# Patient Record
Sex: FEMALE | Race: WHITE | NOT HISPANIC OR LATINO
[De-identification: names, ages, dates, MRNs, and addresses within clinical notes are randomized per-mention and may not be internally consistent; named-entity substitution may affect disease eponyms.]

---

## 2018-02-20 ENCOUNTER — APPOINTMENT (OUTPATIENT)
Dept: INTERNAL MEDICINE | Facility: CLINIC | Age: 58
End: 2018-02-20
Payer: COMMERCIAL

## 2018-02-20 ENCOUNTER — LABORATORY RESULT (OUTPATIENT)
Age: 58
End: 2018-02-20

## 2018-02-20 ENCOUNTER — NON-APPOINTMENT (OUTPATIENT)
Age: 58
End: 2018-02-20

## 2018-02-20 VITALS
WEIGHT: 110 LBS | HEART RATE: 70 BPM | SYSTOLIC BLOOD PRESSURE: 130 MMHG | BODY MASS INDEX: 19.49 KG/M2 | RESPIRATION RATE: 14 BRPM | DIASTOLIC BLOOD PRESSURE: 78 MMHG | HEIGHT: 63 IN

## 2018-02-20 VITALS
HEART RATE: 74 BPM | WEIGHT: 108 LBS | DIASTOLIC BLOOD PRESSURE: 80 MMHG | BODY MASS INDEX: 19.14 KG/M2 | SYSTOLIC BLOOD PRESSURE: 125 MMHG | RESPIRATION RATE: 15 BRPM | HEIGHT: 63 IN

## 2018-02-20 DIAGNOSIS — M25.511 PAIN IN RIGHT SHOULDER: ICD-10-CM

## 2018-02-20 PROCEDURE — 99396 PREV VISIT EST AGE 40-64: CPT | Mod: 25

## 2018-02-20 PROCEDURE — 99213 OFFICE O/P EST LOW 20 MIN: CPT | Mod: 25

## 2018-02-20 PROCEDURE — G0008: CPT

## 2018-02-20 PROCEDURE — 93000 ELECTROCARDIOGRAM COMPLETE: CPT

## 2018-02-20 PROCEDURE — 90686 IIV4 VACC NO PRSV 0.5 ML IM: CPT

## 2018-03-05 LAB
25(OH)D3 SERPL-MCNC: 36.9 NG/ML
ALBUMIN SERPL ELPH-MCNC: 5.1 G/DL
ALP BLD-CCNC: 60 U/L
ALT SERPL-CCNC: 21 U/L
ANION GAP SERPL CALC-SCNC: 15 MMOL/L
APPEARANCE: CLEAR
AST SERPL-CCNC: 38 U/L
BASOPHILS # BLD AUTO: 0.04 K/UL
BASOPHILS NFR BLD AUTO: 0.6 %
BILIRUB SERPL-MCNC: 0.5 MG/DL
BILIRUBIN URINE: NEGATIVE
BLOOD URINE: NEGATIVE
BUN SERPL-MCNC: 14 MG/DL
CALCIUM SERPL-MCNC: 10 MG/DL
CHLORIDE SERPL-SCNC: 100 MMOL/L
CHOLEST SERPL-MCNC: 237 MG/DL
CHOLEST/HDLC SERPL: 2.3 RATIO
CO2 SERPL-SCNC: 27 MMOL/L
COLOR: YELLOW
CREAT SERPL-MCNC: 1.1 MG/DL
CRP SERPL HS-MCNC: 1 MG/L
EOSINOPHIL # BLD AUTO: 0.15 K/UL
EOSINOPHIL NFR BLD AUTO: 2.1 %
GLUCOSE BS SERPL-MCNC: 88 MG/DL
GLUCOSE QUALITATIVE U: NEGATIVE MG/DL
GLUCOSE SERPL-MCNC: 92 MG/DL
HBA1C MFR BLD HPLC: 4.9 %
HCT VFR BLD CALC: 43.5 %
HDLC SERPL-MCNC: 101 MG/DL
HGB BLD-MCNC: 14.6 G/DL
IMM GRANULOCYTES NFR BLD AUTO: 0.1 %
KETONES URINE: NEGATIVE
LDLC SERPL CALC-MCNC: 113 MG/DL
LEUKOCYTE ESTERASE URINE: ABNORMAL
LYMPHOCYTES # BLD AUTO: 2.23 K/UL
LYMPHOCYTES NFR BLD AUTO: 30.8 %
MAN DIFF?: NORMAL
MCHC RBC-ENTMCNC: 33 PG
MCHC RBC-ENTMCNC: 33.6 GM/DL
MCV RBC AUTO: 98.4 FL
MONOCYTES # BLD AUTO: 0.7 K/UL
MONOCYTES NFR BLD AUTO: 9.7 %
NEUTROPHILS # BLD AUTO: 4.1 K/UL
NEUTROPHILS NFR BLD AUTO: 56.7 %
NITRITE URINE: NEGATIVE
PH URINE: 7
PLATELET # BLD AUTO: 293 K/UL
POTASSIUM SERPL-SCNC: 4.1 MMOL/L
PROT SERPL-MCNC: 8.4 G/DL
PROTEIN URINE: NEGATIVE MG/DL
RBC # BLD: 4.42 M/UL
RBC # FLD: 13 %
SODIUM SERPL-SCNC: 142 MMOL/L
SPECIFIC GRAVITY URINE: 1.01
T4 FREE SERPL-MCNC: 1.1 NG/DL
TRIGL SERPL-MCNC: 117 MG/DL
TSH SERPL-ACNC: 2.37 UIU/ML
UROBILINOGEN URINE: NEGATIVE MG/DL
VIT B12 SERPL-MCNC: 540 PG/ML
WBC # FLD AUTO: 7.23 K/UL

## 2018-03-06 ENCOUNTER — APPOINTMENT (OUTPATIENT)
Dept: MAMMOGRAPHY | Facility: HOSPITAL | Age: 58
End: 2018-03-06

## 2018-03-13 ENCOUNTER — LABORATORY RESULT (OUTPATIENT)
Age: 58
End: 2018-03-13

## 2018-03-13 ENCOUNTER — APPOINTMENT (OUTPATIENT)
Dept: MAMMOGRAPHY | Facility: HOSPITAL | Age: 58
End: 2018-03-13
Payer: COMMERCIAL

## 2018-03-13 ENCOUNTER — OUTPATIENT (OUTPATIENT)
Dept: OUTPATIENT SERVICES | Facility: HOSPITAL | Age: 58
LOS: 1 days | End: 2018-03-13
Payer: COMMERCIAL

## 2018-03-13 ENCOUNTER — APPOINTMENT (OUTPATIENT)
Dept: RADIOLOGY | Facility: HOSPITAL | Age: 58
End: 2018-03-13
Payer: COMMERCIAL

## 2018-03-13 DIAGNOSIS — M85.852 OTHER SPECIFIED DISORDERS OF BONE DENSITY AND STRUCTURE, LEFT THIGH: ICD-10-CM

## 2018-03-13 DIAGNOSIS — Z00.8 ENCOUNTER FOR OTHER GENERAL EXAMINATION: ICD-10-CM

## 2018-03-13 PROCEDURE — 77080 DXA BONE DENSITY AXIAL: CPT | Mod: 26

## 2018-03-13 PROCEDURE — 77067 SCR MAMMO BI INCL CAD: CPT | Mod: 26

## 2018-03-13 PROCEDURE — 77063 BREAST TOMOSYNTHESIS BI: CPT

## 2018-03-13 PROCEDURE — 77063 BREAST TOMOSYNTHESIS BI: CPT | Mod: 26

## 2018-03-13 PROCEDURE — 77067 SCR MAMMO BI INCL CAD: CPT

## 2018-03-13 PROCEDURE — 77080 DXA BONE DENSITY AXIAL: CPT

## 2018-03-20 DIAGNOSIS — Z12.31 ENCOUNTER FOR SCREENING MAMMOGRAM FOR MALIGNANT NEOPLASM OF BREAST: ICD-10-CM

## 2018-03-20 DIAGNOSIS — M85.852 OTHER SPECIFIED DISORDERS OF BONE DENSITY AND STRUCTURE, LEFT THIGH: ICD-10-CM

## 2018-06-19 ENCOUNTER — APPOINTMENT (OUTPATIENT)
Dept: RADIOLOGY | Facility: HOSPITAL | Age: 58
End: 2018-06-19
Payer: COMMERCIAL

## 2018-06-19 ENCOUNTER — OUTPATIENT (OUTPATIENT)
Dept: OUTPATIENT SERVICES | Facility: HOSPITAL | Age: 58
LOS: 1 days | End: 2018-06-19
Payer: COMMERCIAL

## 2018-06-19 DIAGNOSIS — Z00.8 ENCOUNTER FOR OTHER GENERAL EXAMINATION: ICD-10-CM

## 2018-06-19 PROCEDURE — 71046 X-RAY EXAM CHEST 2 VIEWS: CPT | Mod: 26

## 2018-06-19 PROCEDURE — 71046 X-RAY EXAM CHEST 2 VIEWS: CPT

## 2018-08-09 ENCOUNTER — RESULT REVIEW (OUTPATIENT)
Age: 58
End: 2018-08-09

## 2019-03-18 ENCOUNTER — FORM ENCOUNTER (OUTPATIENT)
Age: 59
End: 2019-03-18

## 2019-03-18 ENCOUNTER — RX RENEWAL (OUTPATIENT)
Age: 59
End: 2019-03-18

## 2019-03-19 ENCOUNTER — APPOINTMENT (OUTPATIENT)
Dept: MAMMOGRAPHY | Facility: HOSPITAL | Age: 59
End: 2019-03-19
Payer: COMMERCIAL

## 2019-03-19 ENCOUNTER — OUTPATIENT (OUTPATIENT)
Dept: OUTPATIENT SERVICES | Facility: HOSPITAL | Age: 59
LOS: 1 days | End: 2019-03-19
Payer: COMMERCIAL

## 2019-03-19 DIAGNOSIS — Z00.8 ENCOUNTER FOR OTHER GENERAL EXAMINATION: ICD-10-CM

## 2019-03-19 PROCEDURE — 77067 SCR MAMMO BI INCL CAD: CPT

## 2019-03-19 PROCEDURE — 77063 BREAST TOMOSYNTHESIS BI: CPT

## 2019-03-19 PROCEDURE — 77067 SCR MAMMO BI INCL CAD: CPT | Mod: 26

## 2019-03-19 PROCEDURE — 77063 BREAST TOMOSYNTHESIS BI: CPT | Mod: 26

## 2019-04-18 ENCOUNTER — APPOINTMENT (OUTPATIENT)
Dept: INTERNAL MEDICINE | Facility: CLINIC | Age: 59
End: 2019-04-18
Payer: COMMERCIAL

## 2019-04-18 VITALS
WEIGHT: 108 LBS | BODY MASS INDEX: 19.14 KG/M2 | HEIGHT: 63 IN | HEART RATE: 75 BPM | SYSTOLIC BLOOD PRESSURE: 124 MMHG | DIASTOLIC BLOOD PRESSURE: 72 MMHG | RESPIRATION RATE: 15 BRPM

## 2019-04-18 DIAGNOSIS — M75.30 CALCIFIC TENDINITIS OF UNSPECIFIED SHOULDER: ICD-10-CM

## 2019-04-18 DIAGNOSIS — I35.1 NONRHEUMATIC AORTIC (VALVE) INSUFFICIENCY: ICD-10-CM

## 2019-04-18 DIAGNOSIS — R00.1 BRADYCARDIA, UNSPECIFIED: ICD-10-CM

## 2019-04-18 PROCEDURE — 99396 PREV VISIT EST AGE 40-64: CPT

## 2019-04-18 NOTE — PHYSICAL EXAM
[No Acute Distress] : no acute distress [Well Nourished] : well nourished [Well Developed] : well developed [Well-Appearing] : well-appearing [PERRL] : pupils equal round and reactive to light [Normal Sclera/Conjunctiva] : normal sclera/conjunctiva [EOMI] : extraocular movements intact [Normal Outer Ear/Nose] : the outer ears and nose were normal in appearance [Normal Oropharynx] : the oropharynx was normal [No JVD] : no jugular venous distention [Supple] : supple [No Lymphadenopathy] : no lymphadenopathy [Thyroid Normal, No Nodules] : the thyroid was normal and there were no nodules present [No Respiratory Distress] : no respiratory distress  [Clear to Auscultation] : lungs were clear to auscultation bilaterally [No Accessory Muscle Use] : no accessory muscle use [Normal Rate] : normal rate  [Regular Rhythm] : with a regular rhythm [Normal S1, S2] : normal S1 and S2 [No Murmur] : no murmur heard [No Carotid Bruits] : no carotid bruits [No Abdominal Bruit] : a ~M bruit was not heard ~T in the abdomen [No Varicosities] : no varicosities [Pedal Pulses Present] : the pedal pulses are present [No Edema] : there was no peripheral edema [No Extremity Clubbing/Cyanosis] : no extremity clubbing/cyanosis [No Palpable Aorta] : no palpable aorta [Declined Breast Exam] : declined breast exam  [Soft] : abdomen soft [Non Tender] : non-tender [Non-distended] : non-distended [No Masses] : no abdominal mass palpated [No HSM] : no HSM [Normal Bowel Sounds] : normal bowel sounds [No Hernias] : no hernias [Normal Posterior Cervical Nodes] : no posterior cervical lymphadenopathy [Declined Rectal Exam] : declined rectal exam [Normal Anterior Cervical Nodes] : no anterior cervical lymphadenopathy [No CVA Tenderness] : no CVA  tenderness [No Spinal Tenderness] : no spinal tenderness [No Joint Swelling] : no joint swelling [Grossly Normal Strength/Tone] : grossly normal strength/tone [No Rash] : no rash [Coordination Grossly Intact] : coordination grossly intact [Normal Gait] : normal gait [No Focal Deficits] : no focal deficits [Deep Tendon Reflexes (DTR)] : deep tendon reflexes were 2+ and symmetric [Speech Grossly Normal] : speech grossly normal [Memory Grossly Normal] : memory grossly normal [Normal Affect] : the affect was normal [Alert and Oriented x3] : oriented to person, place, and time [Normal Mood] : the mood was normal [Normal Insight/Judgement] : insight and judgment were intact [Scoliosis] : no scoliosis [Kyphosis] : no kyphosis

## 2019-04-18 NOTE — HISTORY OF PRESENT ILLNESS
[FreeTextEntry1] : Comes to the office for a comprehensive physical examination to review her medications and discuss her overall health recent issues with hemorrhoids and calcific bursitis of her shoulder [de-identified] : 58-year-old woman comes to the office for a comprehensive physical examination and history of hemorrhoids migraines calcific bursitis of her right shoulder mild aortic insufficiency and sinus bradycardia patient's shoulder has been doing quite well with minimal to no residual status post removal of calcium from the shoulder. She denies temperature chills sweats or myalgias she gets occasional headaches and denies sinus congestion sore throat cough wheezing pleurisy chest pain shortness of breath exertional dyspnea lightheadedness palpitations dizziness vertigo or syncope she denies abdominal pain she has occasional hemorrhoidal bleeding colonoscopy was performed in 2017% no constipation diarrhea black stools her appetite has been good and her weight is very consistent between 107 and 109 lbs. she sleeps well and has no nocturia she denies dysuria and gross hematuria

## 2019-04-18 NOTE — ASSESSMENT
[FreeTextEntry1] : Physical exam shows a well-developed female in no acute distress her blood pressure was 124/72 weight 108 height 5 foot 3 BMI 19.13 heart rate is 75 respirations at 18 HEENT was unremarkable chest was clear cardiovascular exam shows a normal S1 and S2 no murmurs were appreciated extremities show no clubbing cyanosis or edema abdomen was soft and neurologic exam was nonfocal EKG recently done at her cardiologist 2 weeks ago will be obtained last slip was given for complete blood test including a Lyme's test as per her cardiologist which is secondary to her bradycardia also included are CBC full chemistries lipid profile hemoglobin A1c thyroid profile C-reactive protein urinalysis and vitamins and D3 and B12. Underwent a mammogram in March of 2019 and a bone density in March of 2018 and her colonoscopy was performed in September 2070

## 2019-04-18 NOTE — REVIEW OF SYSTEMS
[Joint Pain] : joint pain [Negative] : Heme/Lymph [Fever] : no fever [Chills] : no chills [Fatigue] : no fatigue [Hot Flashes] : no hot flashes [Night Sweats] : no night sweats [Recent Change In Weight] : ~T no recent weight change [Discharge] : no discharge [Pain] : no pain [Redness] : no redness [Vision Problems] : no vision problems [Dryness] : no dryness  [Earache] : no earache [Itching] : no itching [Hearing Loss] : no hearing loss [Nosebleed] : no nosebleeds [Nasal Discharge] : no nasal discharge [Hoarseness] : no hoarseness [Palpitations] : no palpitations [Chest Pain] : no chest pain [Sore Throat] : no sore throat [Leg Claudication] : no leg claudication [Lower Ext Edema] : no lower extremity edema [Orthopnea] : no orthopnea [Paroysmal Nocturnal Dyspnea] : no paroysmal nocturnal dyspnea [Cough] : no cough [Shortness Of Breath] : no shortness of breath [Wheezing] : no wheezing [Dyspnea on Exertion] : no dyspnea on exertion [Abdominal Pain] : no abdominal pain [Nausea] : no nausea [Constipation] : no constipation [Vomiting] : no vomiting [Diarrhea] : diarrhea [Heartburn] : no heartburn [Melena] : no melena [Dysuria] : no dysuria [Incontinence] : no incontinence [Hematuria] : no hematuria [Poor Libido] : libido not poor [Frequency] : no frequency [Vaginal Discharge] : no vaginal discharge [Joint Stiffness] : no joint stiffness [Joint Swelling] : no joint swelling [Back Pain] : no back pain [Muscle Weakness] : no muscle weakness [Muscle Pain] : no muscle pain [Itching] : no itching [Nail Changes] : no nail changes [Mole Changes] : no mole changes [Hair Changes] : no hair changes [Skin Rash] : no skin rash [Headache] : no headache [Dizziness] : no dizziness [Fainting] : no fainting [Confusion] : no confusion [Memory Loss] : no memory loss [Anxiety] : no anxiety [Suicidal] : not suicidal [Insomnia] : no insomnia [Easy Bleeding] : no easy bleeding [Depression] : no depression [Easy Bruising] : no easy bruising [Swollen Glands] : no swollen glands [FreeTextEntry9] : right shoulder

## 2019-04-18 NOTE — HEALTH RISK ASSESSMENT
[No falls in past year] : Patient reported no falls in the past year [0] : 2) Feeling down, depressed, or hopeless: Not at all (0) [[id number]] : 0 [MammogramDate] : 03/19 [PapSmearDate] : 08/18 [BoneDensityDate] : 03/18 [ColonoscopyDate] : 09/17

## 2019-06-16 ENCOUNTER — TRANSCRIPTION ENCOUNTER (OUTPATIENT)
Age: 59
End: 2019-06-16

## 2019-06-28 ENCOUNTER — LABORATORY RESULT (OUTPATIENT)
Age: 59
End: 2019-06-28

## 2019-07-19 LAB
25(OH)D3 SERPL-MCNC: 48.5 NG/ML
ALBUMIN SERPL ELPH-MCNC: 4.9 G/DL
ALP BLD-CCNC: 71 U/L
ALT SERPL-CCNC: 16 U/L
ANION GAP SERPL CALC-SCNC: 11 MMOL/L
APPEARANCE: CLEAR
AST SERPL-CCNC: 33 U/L
B BURGDOR AB SER-IMP: NEGATIVE
B BURGDOR IGG+IGM SER QL IB: NORMAL
B BURGDOR IGM PATRN SER IB-IMP: NEGATIVE
B BURGDOR18/20KD IGM SER QL IB: NORMAL
B BURGDOR18KD IGG SER QL IB: NORMAL
B BURGDOR23KD IGG SER QL IB: NORMAL
B BURGDOR23KD IGM SER QL IB: NORMAL
B BURGDOR28KD AB SER QL IB: NORMAL
B BURGDOR28KD IGG SER QL IB: NORMAL
B BURGDOR30KD AB SER QL IB: NORMAL
B BURGDOR30KD IGG SER QL IB: NORMAL
B BURGDOR31KD IGG SER QL IB: NORMAL
B BURGDOR31KD IGM SER QL IB: NORMAL
B BURGDOR39KD IGG SER QL IB: NORMAL
B BURGDOR39KD IGM SER QL IB: NORMAL
B BURGDOR41KD IGG SER QL IB: PRESENT
B BURGDOR41KD IGM SER QL IB: PRESENT
B BURGDOR45KD AB SER QL IB: NORMAL
B BURGDOR45KD IGG SER QL IB: NORMAL
B BURGDOR58KD AB SER QL IB: NORMAL
B BURGDOR58KD IGG SER QL IB: NORMAL
B BURGDOR66KD IGG SER QL IB: NORMAL
B BURGDOR66KD IGM SER QL IB: NORMAL
B BURGDOR93KD IGG SER QL IB: NORMAL
B BURGDOR93KD IGM SER QL IB: NORMAL
BASOPHILS # BLD AUTO: 0.06 K/UL
BASOPHILS NFR BLD AUTO: 0.8 %
BILIRUB SERPL-MCNC: 0.5 MG/DL
BILIRUBIN URINE: NEGATIVE
BLOOD URINE: NEGATIVE
BUN SERPL-MCNC: 14 MG/DL
CALCIUM SERPL-MCNC: 9.9 MG/DL
CHLORIDE SERPL-SCNC: 104 MMOL/L
CHOLEST SERPL-MCNC: 213 MG/DL
CHOLEST/HDLC SERPL: 2.6 RATIO
CO2 SERPL-SCNC: 27 MMOL/L
COLOR: NORMAL
CREAT SERPL-MCNC: 0.98 MG/DL
CRP SERPL HS-MCNC: 0.78 MG/L
EOSINOPHIL # BLD AUTO: 0.16 K/UL
EOSINOPHIL NFR BLD AUTO: 2.2 %
ESTIMATED AVERAGE GLUCOSE: 97 MG/DL
GLUCOSE BS SERPL-MCNC: 103 MG/DL
GLUCOSE QUALITATIVE U: NEGATIVE
GLUCOSE SERPL-MCNC: 102 MG/DL
HBA1C MFR BLD HPLC: 5 %
HCT VFR BLD CALC: 42.2 %
HDLC SERPL-MCNC: 81 MG/DL
HGB BLD-MCNC: 13.9 G/DL
IMM GRANULOCYTES NFR BLD AUTO: 0.6 %
KETONES URINE: NEGATIVE
LDLC SERPL CALC-MCNC: 114 MG/DL
LEUKOCYTE ESTERASE URINE: NEGATIVE
LYMPHOCYTES # BLD AUTO: 1.89 K/UL
LYMPHOCYTES NFR BLD AUTO: 26.1 %
MAN DIFF?: NORMAL
MCHC RBC-ENTMCNC: 32.5 PG
MCHC RBC-ENTMCNC: 32.9 GM/DL
MCV RBC AUTO: 98.6 FL
MONOCYTES # BLD AUTO: 0.59 K/UL
MONOCYTES NFR BLD AUTO: 8.1 %
NEUTROPHILS # BLD AUTO: 4.5 K/UL
NEUTROPHILS NFR BLD AUTO: 62.2 %
NITRITE URINE: NEGATIVE
PH URINE: 6
PLATELET # BLD AUTO: 305 K/UL
POTASSIUM SERPL-SCNC: 4 MMOL/L
PROT SERPL-MCNC: 7.6 G/DL
PROTEIN URINE: NEGATIVE
RBC # BLD: 4.28 M/UL
RBC # FLD: 12.7 %
SODIUM SERPL-SCNC: 142 MMOL/L
SPECIFIC GRAVITY URINE: 1.01
T4 FREE SERPL-MCNC: 1.1 NG/DL
TRIGL SERPL-MCNC: 91 MG/DL
TSH SERPL-ACNC: 2.4 UIU/ML
UROBILINOGEN URINE: NORMAL
VIT B12 SERPL-MCNC: 604 PG/ML
WBC # FLD AUTO: 7.24 K/UL

## 2019-09-16 ENCOUNTER — RESULT REVIEW (OUTPATIENT)
Age: 59
End: 2019-09-16

## 2019-10-16 ENCOUNTER — APPOINTMENT (OUTPATIENT)
Dept: INTERNAL MEDICINE | Facility: CLINIC | Age: 59
End: 2019-10-16
Payer: COMMERCIAL

## 2019-10-16 VITALS
HEART RATE: 76 BPM | DIASTOLIC BLOOD PRESSURE: 78 MMHG | SYSTOLIC BLOOD PRESSURE: 122 MMHG | BODY MASS INDEX: 18.96 KG/M2 | WEIGHT: 107 LBS | RESPIRATION RATE: 15 BRPM | HEIGHT: 63 IN

## 2019-10-16 VITALS — TEMPERATURE: 98.3 F
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
## 2019-10-16 PROCEDURE — 99215 OFFICE O/P EST HI 40 MIN: CPT

## 2019-10-16 NOTE — HEALTH RISK ASSESSMENT
[Yes] : Yes [No falls in past year] : Patient reported no falls in the past year [0] : 2) Feeling down, depressed, or hopeless: Not at all (0) [] : No [[id number]] : 0

## 2019-10-16 NOTE — ASSESSMENT
[FreeTextEntry1] : Physical exam shows a well-developed female in no acute distress her temperature was 98.3°F blood pressure 122/78 heart rate of 76 respiratory rate of 15 HEENT was unremarkable chest was clear cardiovascular exam was regular abdomen was soft there was a 2 x 1 cm red area in the right lower back associated erythema and induration there was no clear center neurologic exam was nonfocal in view of her locations in the [place] for my review is an area that Lyme's disease at present we'll treat her with doxycycline 100 mg twice a day for 3 weeks patient had a baseline Lyme's Western blot and antibody done in June of 2019. Insulin 3 months we'll repeat the Lyme's titer to evaluate whether her treatment was for Lyme's or for nothing she is up-to-date with her gynecologist ophthalmologists dermatologist and had a recent colonoscopy on September 15, 2017. She defers on the influenza shot today

## 2019-10-16 NOTE — PHYSICAL EXAM
[No Acute Distress] : no acute distress [Well Nourished] : well nourished [Well Developed] : well developed [Well-Appearing] : well-appearing [Normal Voice/Communication] : normal voice/communication [Normal Sclera/Conjunctiva] : normal sclera/conjunctiva [PERRL] : pupils equal round and reactive to light [EOMI] : extraocular movements intact [Normal Outer Ear/Nose] : the outer ears and nose were normal in appearance [Normal Oropharynx] : the oropharynx was normal [Normal TMs] : both tympanic membranes were normal [Normal Nasal Mucosa] : the nasal mucosa was normal [No JVD] : no jugular venous distention [No Lymphadenopathy] : no lymphadenopathy [Supple] : supple [Thyroid Normal, No Nodules] : the thyroid was normal and there were no nodules present [No Respiratory Distress] : no respiratory distress  [No Accessory Muscle Use] : no accessory muscle use [Clear to Auscultation] : lungs were clear to auscultation bilaterally [Normal Percussion] : the chest was normal to percussion [Normal Rate] : normal rate  [Regular Rhythm] : with a regular rhythm [Normal S1, S2] : normal S1 and S2 [No Murmur] : no murmur heard [No Carotid Bruits] : no carotid bruits [No Abdominal Bruit] : a ~M bruit was not heard ~T in the abdomen [No Varicosities] : no varicosities [Pedal Pulses Present] : the pedal pulses are present [No Edema] : there was no peripheral edema [No Palpable Aorta] : no palpable aorta [No Extremity Clubbing/Cyanosis] : no extremity clubbing/cyanosis [Declined Breast Exam] : declined breast exam  [Soft] : abdomen soft [Non Tender] : non-tender [Non-distended] : non-distended [No Masses] : no abdominal mass palpated [No HSM] : no HSM [Normal Bowel Sounds] : normal bowel sounds [No Hernias] : no hernias [Declined Rectal Exam] : declined rectal exam [Normal Supraclavicular Nodes] : no supraclavicular lymphadenopathy [Normal Axillary Nodes] : no axillary lymphadenopathy [Normal Posterior Cervical Nodes] : no posterior cervical lymphadenopathy [Normal Anterior Cervical Nodes] : no anterior cervical lymphadenopathy [Normal Inguinal Nodes] : no inguinal lymphadenopathy [Normal Femoral Nodes] : no femoral lymphadenopathy [No CVA Tenderness] : no CVA  tenderness [No Spinal Tenderness] : no spinal tenderness [No Joint Swelling] : no joint swelling [Grossly Normal Strength/Tone] : grossly normal strength/tone [No Rash] : no rash [Coordination Grossly Intact] : coordination grossly intact [No Focal Deficits] : no focal deficits [Normal Gait] : normal gait [Deep Tendon Reflexes (DTR)] : deep tendon reflexes were 2+ and symmetric [Speech Grossly Normal] : speech grossly normal [Memory Grossly Normal] : memory grossly normal [Normal Affect] : the affect was normal [Alert and Oriented x3] : oriented to person, place, and time [Normal Mood] : the mood was normal [Normal Insight/Judgement] : insight and judgment were intact [Kyphosis] : no kyphosis [Scoliosis] : no scoliosis [de-identified] : Small 2 x 1 cm area of erythema is somewhat indurated on her right lower back

## 2019-10-16 NOTE — REVIEW OF SYSTEMS
[Joint Pain] : joint pain [Skin Rash] : skin rash [Negative] : Heme/Lymph [Fever] : no fever [Chills] : no chills [Fatigue] : no fatigue [Hot Flashes] : no hot flashes [Night Sweats] : no night sweats [Recent Change In Weight] : ~T no recent weight change [Discharge] : no discharge [Pain] : no pain [Redness] : no redness [Dryness] : no dryness  [Vision Problems] : no vision problems [Itching] : no itching [Earache] : no earache [Hearing Loss] : no hearing loss [Nosebleed] : no nosebleeds [Hoarseness] : no hoarseness [Nasal Discharge] : no nasal discharge [Sore Throat] : no sore throat [Chest Pain] : no chest pain [Palpitations] : no palpitations [Leg Claudication] : no leg claudication [Lower Ext Edema] : no lower extremity edema [Orthopnea] : no orthopnea [Shortness Of Breath] : no shortness of breath [Wheezing] : no wheezing [Cough] : no cough [Dyspnea on Exertion] : no dyspnea on exertion [Abdominal Pain] : no abdominal pain [Nausea] : no nausea [Constipation] : no constipation [Diarrhea] : diarrhea [Vomiting] : no vomiting [Heartburn] : no heartburn [Melena] : no melena [Dysuria] : no dysuria [Incontinence] : no incontinence [Poor Libido] : libido not poor [Hematuria] : no hematuria [Frequency] : no frequency [Vaginal Discharge] : no vaginal discharge [Joint Stiffness] : no joint stiffness [Joint Swelling] : no joint swelling [Muscle Weakness] : no muscle weakness [Muscle Pain] : no muscle pain [Back Pain] : no back pain [Mole Changes] : no mole changes [Nail Changes] : no nail changes [Hair Changes] : no hair changes [Headache] : no headache [Dizziness] : no dizziness [Fainting] : no fainting [Confusion] : no confusion [Memory Loss] : no memory loss [Suicidal] : not suicidal [Insomnia] : no insomnia [Anxiety] : no anxiety [Depression] : no depression [Easy Bleeding] : no easy bleeding [Easy Bruising] : no easy bruising [Swollen Glands] : no swollen glands [FreeTextEntry9] : right shoulder [de-identified] : reddish area right lower back

## 2019-10-16 NOTE — HISTORY OF PRESENT ILLNESS
[FreeTextEntry1] : Comes to the office for followup to review her medications and discuss her overall health recently bitten by a tick [de-identified] :  59-year-old woman with a history of migraines hemorrhoids bursitis over her shoulder comes to the office for followup patient was in the [facility] this weekend returning home on Monday on Tuesday she noticed a tick on her right lower back removed the tick and the area has become red and swollen and there is no bull's-eye rash she denies temperature chills sweats or myalgias she has had no headaches sinus congestion sore throat cough wheezing pleurisy chest pain shortness of breath exertional dyspnea lightheadedness palpitations dizziness vertigo or syncope she denies abdominal pain nausea vomiting diarrhea constipation bright red blood per rectum or black stools she has had no musculoskeletal complaints or muscle aches she has had no urinary symptoms leg edema

## 2021-04-26 ENCOUNTER — RESULT REVIEW (OUTPATIENT)
Age: 61
End: 2021-04-26

## 2021-06-18 ENCOUNTER — OUTPATIENT (OUTPATIENT)
Dept: OUTPATIENT SERVICES | Facility: HOSPITAL | Age: 61
LOS: 1 days | End: 2021-06-18
Payer: COMMERCIAL

## 2021-06-18 ENCOUNTER — APPOINTMENT (OUTPATIENT)
Dept: RADIOLOGY | Facility: HOSPITAL | Age: 61
End: 2021-06-18
Payer: COMMERCIAL

## 2021-06-18 ENCOUNTER — APPOINTMENT (OUTPATIENT)
Dept: MAMMOGRAPHY | Facility: HOSPITAL | Age: 61
End: 2021-06-18
Payer: COMMERCIAL

## 2021-06-18 DIAGNOSIS — Z00.8 ENCOUNTER FOR OTHER GENERAL EXAMINATION: ICD-10-CM

## 2021-06-18 PROCEDURE — 77067 SCR MAMMO BI INCL CAD: CPT | Mod: 26

## 2021-06-18 PROCEDURE — 77080 DXA BONE DENSITY AXIAL: CPT | Mod: 26

## 2021-06-18 PROCEDURE — 77063 BREAST TOMOSYNTHESIS BI: CPT | Mod: 26

## 2021-06-18 PROCEDURE — 77067 SCR MAMMO BI INCL CAD: CPT

## 2021-06-18 PROCEDURE — 77063 BREAST TOMOSYNTHESIS BI: CPT

## 2021-06-18 PROCEDURE — 77080 DXA BONE DENSITY AXIAL: CPT

## 2021-06-30 ENCOUNTER — APPOINTMENT (OUTPATIENT)
Dept: INTERNAL MEDICINE | Facility: CLINIC | Age: 61
End: 2021-06-30

## 2021-06-30 RX ORDER — DOXYCYCLINE HYCLATE 100 MG/1
100 CAPSULE ORAL TWICE DAILY
Qty: 42 | Refills: 0 | Status: DISCONTINUED | COMMUNITY
Start: 2019-10-16 | End: 2021-06-30

## 2021-06-30 RX ORDER — HYDROCORTISONE 25 MG/G
2.5 CREAM TOPICAL
Qty: 1 | Refills: 5 | Status: DISCONTINUED | COMMUNITY
Start: 2018-02-20 | End: 2021-06-30

## 2021-06-30 RX ORDER — ACETAMINOPHEN/DIPHENHYDRAMINE 500MG-25MG
TABLET ORAL
Refills: 0 | Status: DISCONTINUED | COMMUNITY
End: 2021-06-30

## 2022-04-20 ENCOUNTER — APPOINTMENT (OUTPATIENT)
Dept: INTERNAL MEDICINE | Facility: CLINIC | Age: 62
End: 2022-04-20
Payer: COMMERCIAL

## 2022-04-20 ENCOUNTER — NON-APPOINTMENT (OUTPATIENT)
Age: 62
End: 2022-04-20

## 2022-04-20 VITALS
WEIGHT: 108 LBS | DIASTOLIC BLOOD PRESSURE: 64 MMHG | TEMPERATURE: 97.3 F | HEIGHT: 63 IN | HEART RATE: 54 BPM | SYSTOLIC BLOOD PRESSURE: 110 MMHG | BODY MASS INDEX: 19.14 KG/M2 | RESPIRATION RATE: 16 BRPM | OXYGEN SATURATION: 100 %

## 2022-04-20 DIAGNOSIS — R00.2 PALPITATIONS: ICD-10-CM

## 2022-04-20 DIAGNOSIS — G43.909 MIGRAINE, UNSPECIFIED, NOT INTRACTABLE, W/OUT STATUS MIGRAINOSUS: ICD-10-CM

## 2022-04-20 DIAGNOSIS — K64.9 UNSPECIFIED HEMORRHOIDS: ICD-10-CM

## 2022-04-20 PROCEDURE — 93000 ELECTROCARDIOGRAM COMPLETE: CPT | Mod: 59

## 2022-04-20 PROCEDURE — 99396 PREV VISIT EST AGE 40-64: CPT | Mod: 25

## 2022-04-26 NOTE — HISTORY OF PRESENT ILLNESS
[FreeTextEntry1] : 61-year-old woman comes to the office for a comprehensive physical examination to review her medications and discuss her overall health recent issues with migraines [de-identified] : Comes to the office for a comprehensive physical examination with a history of migraine headaches hemorrhoids and remote COVID-19 viral infection patient currently denies temperature chills sweats or myalgias has had occasional headaches denies sinus congestion sore throat cough wheezing pleurisy chest pain shortness of breath exertional dyspnea lightheadedness palpitations dizziness vertigo or syncope denies abdominal pain nausea vomiting diarrhea constipation bright red blood per rectum or black stools hemorrhoids have not been bothering her recently she does get up at night to urinate but denies dysuria or gross hematuria she has occasional pain in her right shoulder denies skin rashes or leg edema has had no bouts of anxiety or depression and has been sleeping adequately recently

## 2022-04-26 NOTE — ASSESSMENT
[FreeTextEntry1] : Physical examination shows a well-developed woman in no acute distress blood pressure 110/64 height 5 feet 3 inches weight 108 pounds BMI 19.13 temperature 97.3 °F heart rate of 54 respirations 16 oxygen saturation on room air was 100% HEENT was unremarkable chest was clear cardiovascular exam was regular with no extra heart sounds or murmurs abdomen was soft and nontender extremities showed no clubbing cyanosis or edema neurologic exam was nonfocal EKG showed a normal sinus rhythm at 51/sinus bradycardia there were no acute ST-T wave changes/within normal limits a slip was given for mammography breast ultrasound and axial bone density and also for complete blood tests including CBC full chemistries lipid profile thyroid profile urine analysis CRP hemoglobin A1c vitamins D3 and B12 COVID-19 nuclear capsid and spike domain antibodies she has received her COVID-19 vaccinations last colonoscopy performed by Dr. David Landon was in September 2017 5-year follow-up was recommended which would be at the end of this year is up to date with her ophthalmologist and will consider a dermatology visit for cancer screening patient uses her butalbital APAP/codeine occasionally for her migraines patient is aware of the new migraine medicines and does not feel at the present time that her situation warrants it

## 2022-04-26 NOTE — REVIEW OF SYSTEMS
[Joint Pain] : joint pain [Negative] : Heme/Lymph [Fever] : no fever [Chills] : no chills [Fatigue] : no fatigue [Hot Flashes] : no hot flashes [Night Sweats] : no night sweats [Recent Change In Weight] : ~T no recent weight change [Discharge] : no discharge [Pain] : no pain [Redness] : no redness [Dryness] : no dryness  [Vision Problems] : no vision problems [Itching] : no itching [Earache] : no earache [Hearing Loss] : no hearing loss [Nosebleed] : no nosebleeds [Hoarseness] : no hoarseness [Nasal Discharge] : no nasal discharge [Sore Throat] : no sore throat [Postnasal Drip] : no postnasal drip [Chest Pain] : no chest pain [Palpitations] : no palpitations [Leg Claudication] : no leg claudication [Lower Ext Edema] : no lower extremity edema [Orthopnea] : no orthopnea [Shortness Of Breath] : no shortness of breath [Wheezing] : no wheezing [Cough] : no cough [Dyspnea on Exertion] : no dyspnea on exertion [Abdominal Pain] : no abdominal pain [Nausea] : no nausea [Constipation] : no constipation [Diarrhea] : diarrhea [Vomiting] : no vomiting [Heartburn] : no heartburn [Melena] : no melena [Dysuria] : no dysuria [Incontinence] : no incontinence [Nocturia] : nocturia [Poor Libido] : libido not poor [Hematuria] : no hematuria [Frequency] : no frequency [Vaginal Discharge] : no vaginal discharge [Joint Stiffness] : no joint stiffness [Joint Swelling] : no joint swelling [Muscle Weakness] : no muscle weakness [Muscle Pain] : no muscle pain [Back Pain] : no back pain [Itching] : no itching [Mole Changes] : no mole changes [Nail Changes] : no nail changes [Hair Changes] : no hair changes [Skin Rash] : no skin rash [Headache] : no headache [Dizziness] : no dizziness [Fainting] : no fainting [Confusion] : no confusion [Memory Loss] : no memory loss [Suicidal] : not suicidal [Insomnia] : no insomnia [Anxiety] : no anxiety [Depression] : no depression [Easy Bleeding] : no easy bleeding [Easy Bruising] : no easy bruising [Swollen Glands] : no swollen glands [FreeTextEntry9] : right shoulder

## 2022-04-26 NOTE — PHYSICAL EXAM
[No Acute Distress] : no acute distress [Well Nourished] : well nourished [Well Developed] : well developed [Well-Appearing] : well-appearing [Normal Voice/Communication] : normal voice/communication [Normal Sclera/Conjunctiva] : normal sclera/conjunctiva [PERRL] : pupils equal round and reactive to light [EOMI] : extraocular movements intact [Normal Outer Ear/Nose] : the outer ears and nose were normal in appearance [Normal Oropharynx] : the oropharynx was normal [Normal TMs] : both tympanic membranes were normal [Normal Nasal Mucosa] : the nasal mucosa was normal [No JVD] : no jugular venous distention [No Lymphadenopathy] : no lymphadenopathy [Supple] : supple [Thyroid Normal, No Nodules] : the thyroid was normal and there were no nodules present [No Respiratory Distress] : no respiratory distress  [No Accessory Muscle Use] : no accessory muscle use [Clear to Auscultation] : lungs were clear to auscultation bilaterally [Normal Percussion] : the chest was normal to percussion [Normal Rate] : normal rate  [Regular Rhythm] : with a regular rhythm [Normal S1, S2] : normal S1 and S2 [No Murmur] : no murmur heard [No Carotid Bruits] : no carotid bruits [No Abdominal Bruit] : a ~M bruit was not heard ~T in the abdomen [No Varicosities] : no varicosities [Pedal Pulses Present] : the pedal pulses are present [No Edema] : there was no peripheral edema [No Palpable Aorta] : no palpable aorta [No Extremity Clubbing/Cyanosis] : no extremity clubbing/cyanosis [Declined Breast Exam] : declined breast exam  [Soft] : abdomen soft [Non Tender] : non-tender [Non-distended] : non-distended [No Masses] : no abdominal mass palpated [No HSM] : no HSM [Normal Bowel Sounds] : normal bowel sounds [No Hernias] : no hernias [Declined Rectal Exam] : declined rectal exam [Normal Supraclavicular Nodes] : no supraclavicular lymphadenopathy [Normal Axillary Nodes] : no axillary lymphadenopathy [Normal Posterior Cervical Nodes] : no posterior cervical lymphadenopathy [Normal Anterior Cervical Nodes] : no anterior cervical lymphadenopathy [Normal Inguinal Nodes] : no inguinal lymphadenopathy [Normal Femoral Nodes] : no femoral lymphadenopathy [No CVA Tenderness] : no CVA  tenderness [No Spinal Tenderness] : no spinal tenderness [No Joint Swelling] : no joint swelling [Grossly Normal Strength/Tone] : grossly normal strength/tone [No Rash] : no rash [Coordination Grossly Intact] : coordination grossly intact [No Focal Deficits] : no focal deficits [Normal Gait] : normal gait [Deep Tendon Reflexes (DTR)] : deep tendon reflexes were 2+ and symmetric [Speech Grossly Normal] : speech grossly normal [Memory Grossly Normal] : memory grossly normal [Normal Affect] : the affect was normal [Alert and Oriented x3] : oriented to person, place, and time [Normal Mood] : the mood was normal [Normal Insight/Judgement] : insight and judgment were intact [Kyphosis] : no kyphosis [Scoliosis] : no scoliosis [de-identified] : Small 2 x 1 cm area of erythema is somewhat indurated on her right lower back

## 2022-05-04 ENCOUNTER — RESULT REVIEW (OUTPATIENT)
Age: 62
End: 2022-05-04

## 2022-06-02 LAB
25(OH)D3 SERPL-MCNC: 48.1 NG/ML
ALBUMIN SERPL ELPH-MCNC: 4.9 G/DL
ALP BLD-CCNC: 66 U/L
ALT SERPL-CCNC: 15 U/L
ANION GAP SERPL CALC-SCNC: 12 MMOL/L
APPEARANCE: CLEAR
AST SERPL-CCNC: 30 U/L
BASOPHILS # BLD AUTO: 0.05 K/UL
BASOPHILS NFR BLD AUTO: 0.7 %
BILIRUB SERPL-MCNC: 0.3 MG/DL
BILIRUBIN URINE: NEGATIVE
BLOOD URINE: NEGATIVE
BUN SERPL-MCNC: 13 MG/DL
CALCIUM SERPL-MCNC: 9.8 MG/DL
CHLORIDE SERPL-SCNC: 106 MMOL/L
CHOLEST SERPL-MCNC: 212 MG/DL
CO2 SERPL-SCNC: 24 MMOL/L
COLOR: NORMAL
COVID-19 NUCLEOCAPSID  GAM ANTIBODY INTERPRETATION: POSITIVE
COVID-19 SPIKE DOMAIN ANTIBODY INTERPRETATION: POSITIVE
CREAT SERPL-MCNC: 0.92 MG/DL
CRP SERPL HS-MCNC: 0.77 MG/L
EGFR: 71 ML/MIN/1.73M2
EOSINOPHIL # BLD AUTO: 0.17 K/UL
EOSINOPHIL NFR BLD AUTO: 2.4 %
ESTIMATED AVERAGE GLUCOSE: 100 MG/DL
GLUCOSE BS SERPL-MCNC: 81 MG/DL
GLUCOSE QUALITATIVE U: NEGATIVE
GLUCOSE SERPL-MCNC: 91 MG/DL
HBA1C MFR BLD HPLC: 5.1 %
HCT VFR BLD CALC: 43 %
HDLC SERPL-MCNC: 97 MG/DL
HGB BLD-MCNC: 14.1 G/DL
IMM GRANULOCYTES NFR BLD AUTO: 0.1 %
KETONES URINE: NEGATIVE
LDLC SERPL CALC-MCNC: 105 MG/DL
LEUKOCYTE ESTERASE URINE: NEGATIVE
LYMPHOCYTES # BLD AUTO: 2 K/UL
LYMPHOCYTES NFR BLD AUTO: 28.4 %
MAN DIFF?: NORMAL
MCHC RBC-ENTMCNC: 31.5 PG
MCHC RBC-ENTMCNC: 32.8 GM/DL
MCV RBC AUTO: 96.2 FL
MONOCYTES # BLD AUTO: 0.66 K/UL
MONOCYTES NFR BLD AUTO: 9.4 %
NEUTROPHILS # BLD AUTO: 4.15 K/UL
NEUTROPHILS NFR BLD AUTO: 59 %
NITRITE URINE: NEGATIVE
NONHDLC SERPL-MCNC: 115 MG/DL
PH URINE: 5.5
PLATELET # BLD AUTO: 304 K/UL
POTASSIUM SERPL-SCNC: 5 MMOL/L
PROT SERPL-MCNC: 7.5 G/DL
PROTEIN URINE: NEGATIVE
RBC # BLD: 4.47 M/UL
RBC # FLD: 13 %
SARS-COV-2 AB SERPL IA-ACNC: >250 U/ML
SARS-COV-2 AB SERPL QL IA: 91.8 INDEX
SODIUM SERPL-SCNC: 141 MMOL/L
SPECIFIC GRAVITY URINE: 1.02
T4 FREE SERPL-MCNC: 1.2 NG/DL
TRIGL SERPL-MCNC: 53 MG/DL
TSH SERPL-ACNC: 3.5 UIU/ML
UROBILINOGEN URINE: NORMAL
VIT B12 SERPL-MCNC: 361 PG/ML
WBC # FLD AUTO: 7.04 K/UL

## 2022-08-03 ENCOUNTER — RESULT REVIEW (OUTPATIENT)
Age: 62
End: 2022-08-03

## 2022-08-03 ENCOUNTER — APPOINTMENT (OUTPATIENT)
Dept: ULTRASOUND IMAGING | Facility: HOSPITAL | Age: 62
End: 2022-08-03

## 2022-08-03 ENCOUNTER — APPOINTMENT (OUTPATIENT)
Dept: MAMMOGRAPHY | Facility: HOSPITAL | Age: 62
End: 2022-08-03

## 2022-08-03 ENCOUNTER — OUTPATIENT (OUTPATIENT)
Dept: OUTPATIENT SERVICES | Facility: HOSPITAL | Age: 62
LOS: 1 days | End: 2022-08-03
Payer: COMMERCIAL

## 2022-08-03 DIAGNOSIS — Z00.8 ENCOUNTER FOR OTHER GENERAL EXAMINATION: ICD-10-CM

## 2022-08-03 PROCEDURE — 77067 SCR MAMMO BI INCL CAD: CPT | Mod: 26

## 2022-08-03 PROCEDURE — 76641 ULTRASOUND BREAST COMPLETE: CPT | Mod: 26,50

## 2022-08-03 PROCEDURE — 77063 BREAST TOMOSYNTHESIS BI: CPT | Mod: 26

## 2022-08-03 PROCEDURE — 76641 ULTRASOUND BREAST COMPLETE: CPT

## 2022-08-03 PROCEDURE — 77063 BREAST TOMOSYNTHESIS BI: CPT

## 2022-08-03 PROCEDURE — 77067 SCR MAMMO BI INCL CAD: CPT

## 2023-05-04 ENCOUNTER — APPOINTMENT (OUTPATIENT)
Dept: ULTRASOUND IMAGING | Facility: HOSPITAL | Age: 63
End: 2023-05-04
Payer: COMMERCIAL

## 2023-05-04 ENCOUNTER — OUTPATIENT (OUTPATIENT)
Dept: OUTPATIENT SERVICES | Facility: HOSPITAL | Age: 63
LOS: 1 days | End: 2023-05-04
Payer: COMMERCIAL

## 2023-05-04 DIAGNOSIS — N63.0 UNSPECIFIED LUMP IN UNSPECIFIED BREAST: ICD-10-CM

## 2023-05-04 PROCEDURE — G0279: CPT | Mod: 26

## 2023-05-04 PROCEDURE — G0279: CPT

## 2023-05-04 PROCEDURE — 76641 ULTRASOUND BREAST COMPLETE: CPT

## 2023-05-04 PROCEDURE — 76882 US LMTD JT/FCL EVL NVASC XTR: CPT | Mod: 26,LT,59

## 2023-05-04 PROCEDURE — 76882 US LMTD JT/FCL EVL NVASC XTR: CPT

## 2023-05-04 PROCEDURE — 77065 DX MAMMO INCL CAD UNI: CPT | Mod: 26,LT

## 2023-05-04 PROCEDURE — 76641 ULTRASOUND BREAST COMPLETE: CPT | Mod: 26,LT

## 2023-05-04 PROCEDURE — 77065 DX MAMMO INCL CAD UNI: CPT

## 2023-05-09 ENCOUNTER — APPOINTMENT (OUTPATIENT)
Dept: MAMMOGRAPHY | Facility: HOSPITAL | Age: 63
End: 2023-05-09

## 2023-05-09 ENCOUNTER — APPOINTMENT (OUTPATIENT)
Dept: ULTRASOUND IMAGING | Facility: HOSPITAL | Age: 63
End: 2023-05-09

## 2023-07-18 ENCOUNTER — APPOINTMENT (OUTPATIENT)
Dept: FAMILY MEDICINE | Facility: CLINIC | Age: 63
End: 2023-07-18
Payer: COMMERCIAL

## 2023-07-18 VITALS
TEMPERATURE: 98.6 F | OXYGEN SATURATION: 99 % | WEIGHT: 105 LBS | SYSTOLIC BLOOD PRESSURE: 110 MMHG | HEIGHT: 63 IN | BODY MASS INDEX: 18.61 KG/M2 | HEART RATE: 58 BPM | DIASTOLIC BLOOD PRESSURE: 68 MMHG | RESPIRATION RATE: 15 BRPM

## 2023-07-18 DIAGNOSIS — Z00.00 ENCOUNTER FOR GENERAL ADULT MEDICAL EXAMINATION W/OUT ABNORMAL FINDINGS: ICD-10-CM

## 2023-07-18 DIAGNOSIS — Z13.29 ENCOUNTER FOR SCREENING FOR OTHER SUSPECTED ENDOCRINE DISORDER: ICD-10-CM

## 2023-07-18 DIAGNOSIS — Z13.220 ENCOUNTER FOR SCREENING FOR LIPOID DISORDERS: ICD-10-CM

## 2023-07-18 DIAGNOSIS — Z13.21 ENCOUNTER FOR SCREENING FOR NUTRITIONAL DISORDER: ICD-10-CM

## 2023-07-18 DIAGNOSIS — Z13.1 ENCOUNTER FOR SCREENING FOR DIABETES MELLITUS: ICD-10-CM

## 2023-07-18 DIAGNOSIS — Z23 ENCOUNTER FOR IMMUNIZATION: ICD-10-CM

## 2023-07-18 PROCEDURE — 99396 PREV VISIT EST AGE 40-64: CPT

## 2023-07-18 RX ORDER — DOXYCYCLINE 100 MG/1
100 CAPSULE ORAL
Qty: 10 | Refills: 0 | Status: COMPLETED | COMMUNITY
Start: 2023-05-01

## 2023-07-18 RX ORDER — DEXAMETHASONE 4 MG/1
4 TABLET ORAL
Qty: 4 | Refills: 0 | Status: COMPLETED | COMMUNITY
Start: 2023-07-03

## 2023-07-18 RX ORDER — ESTRADIOL 0.1 MG/G
0.1 CREAM VAGINAL
Qty: 42 | Refills: 0 | Status: ACTIVE | COMMUNITY
Start: 2023-06-07

## 2023-07-18 RX ORDER — AMOXICILLIN 500 MG/1
500 CAPSULE ORAL
Qty: 24 | Refills: 0 | Status: COMPLETED | COMMUNITY
Start: 2023-06-26

## 2023-07-18 NOTE — PHYSICAL EXAM
[Coordination Grossly Intact] : coordination grossly intact [Normal] : affect was normal and insight and judgment were intact [de-identified] : had with GYN

## 2023-07-18 NOTE — HEALTH RISK ASSESSMENT
[4 or more  times a week (4 pts)] : 4 or more  times a week (4 points) [1 or 2 (0 pts)] : 1 or 2 (0 points) [Never (0 pts)] : Never (0 points) [No] : In the past 12 months have you used drugs other than those required for medical reasons? No [0] : 2) Feeling down, depressed, or hopeless: Not at all (0) [PHQ-2 Negative - No further assessment needed] : PHQ-2 Negative - No further assessment needed [With Family] : lives with family [] :  [# Of Children ___] : has [unfilled] children [Never] : Never [Yes] : Yes [Audit-CScore] : 4 [[id number]] : 0 [Patient reported colonoscopy was normal] : Patient reported colonoscopy was normal [MammogramComments] : has script with GYN  [BoneDensityComments] : osteopenia  [ColonoscopyDate] : 09/2017 [ColonoscopyComments] : she will f/u with GI  [de-identified] :   [FreeTextEntry2] : currently not working - worked at a  for a while/and with   [de-identified] : rare in teens

## 2023-07-18 NOTE — PLAN
[FreeTextEntry1] : #HCM \par -lab work script provided \par -vaccinations: \par     COVID : received J and J \par     Shingles - aware can receive at pharmacy \par     TDAP: ordered, no vaccines in office today, aware after ordered can receive another day through RN appt \par -depression screen negative \par -follow w/ GYN up to date - has script for mammo \par -she will f/u with GI for screening colonoscopy \par -follows w/ derm for skin checks \par

## 2023-07-18 NOTE — COUNSELING
[AUDIT-C Screening administered and reviewed] : AUDIT-C Screening administered and reviewed [FreeTextEntry2] : discussed decreasing frequency

## 2023-07-18 NOTE — HISTORY OF PRESENT ILLNESS
[FreeTextEntry1] : CPE  [de-identified] : 63 yo F PMH migraines, aortic insufficiency, bradycardia, BCC for CPE. \par She was following w/ Dr. Ayoub. \par She follows w/ cardio (Dr. Silva), GYN (Dr. Anyi Lee [facility]), Derm ([facility] [facility]). \par She has upcoming echo/stress/calcium score scheduled with cardio.  \par \par HCM: colonoscopy (9/17 was due 9/22)

## 2023-07-19 LAB
25(OH)D3 SERPL-MCNC: 59.3 NG/ML
ALBUMIN SERPL ELPH-MCNC: 4.9 G/DL
ALP BLD-CCNC: 74 U/L
ALT SERPL-CCNC: 13 U/L
ANION GAP SERPL CALC-SCNC: 11 MMOL/L
AST SERPL-CCNC: 30 U/L
BILIRUB SERPL-MCNC: 0.3 MG/DL
BUN SERPL-MCNC: 12 MG/DL
CALCIUM SERPL-MCNC: 10.1 MG/DL
CHLORIDE SERPL-SCNC: 104 MMOL/L
CHOLEST SERPL-MCNC: 236 MG/DL
CO2 SERPL-SCNC: 25 MMOL/L
CREAT SERPL-MCNC: 0.85 MG/DL
EGFR: 78 ML/MIN/1.73M2
ESTIMATED AVERAGE GLUCOSE: 103 MG/DL
GLUCOSE SERPL-MCNC: 97 MG/DL
HBA1C MFR BLD HPLC: 5.2 %
HDLC SERPL-MCNC: 83 MG/DL
LDLC SERPL CALC-MCNC: 141 MG/DL
NONHDLC SERPL-MCNC: 153 MG/DL
POTASSIUM SERPL-SCNC: 5.3 MMOL/L
PROT SERPL-MCNC: 7.5 G/DL
SODIUM SERPL-SCNC: 141 MMOL/L
TRIGL SERPL-MCNC: 71 MG/DL
TSH SERPL-ACNC: 2.47 UIU/ML

## 2023-08-01 ENCOUNTER — APPOINTMENT (OUTPATIENT)
Dept: ALLERGY | Facility: CLINIC | Age: 63
End: 2023-08-01
Payer: COMMERCIAL

## 2023-08-01 DIAGNOSIS — T63.441A TOXIC EFFECT OF VENOM OF BEES, ACCIDENTAL (UNINTENTIONAL), INITIAL ENCOUNTER: ICD-10-CM

## 2023-08-01 PROCEDURE — 99203 OFFICE O/P NEW LOW 30 MIN: CPT

## 2023-08-01 NOTE — PHYSICAL EXAM
[Alert] : alert [Well Nourished] : well nourished [Healthy Appearance] : healthy appearance [No Acute Distress] : no acute distress [Well Developed] : well developed [Normal Voice/Communication] : normal voice communication [No Neck Mass] : no neck mass was observed [No LAD] : no lymphadenopathy [Normal Rate and Effort] : normal respiratory rhythm and effort [No Crackles] : no crackles [No Retractions] : no retractions [Bilateral Audible Breath Sounds] : bilateral audible breath sounds [Wheezing] : no wheezing was heard [Normal Rate] : heart rate was normal  [Normal S1, S2] : normal S1 and S2 [No murmur] : no murmur [Regular Rhythm] : with a regular rhythm [Normal Cervical Lymph Nodes] : cervical [Skin Intact] : skin intact  [No Rash] : no rash [No Cyanosis] : no cyanosis [Normal Mood] : mood was normal [Normal Affect] : affect was normal [Judgment and Insight Age Appropriate] : judgement and insight is age appropriate [Alert, Awake, Oriented as Age-Appropriate] : alert, awake, oriented as age appropriate

## 2023-08-01 NOTE — SOCIAL HISTORY
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

## 2023-08-01 NOTE — ASSESSMENT
[FreeTextEntry1] : Bee sting anaphylaxis:  ImmunoCAP to YJ, YH, WFH, HB, Wasp  Tryptase level EpiPen renewed.   Patient instructed on the proper use of an EpiPen - precautions - follow up after use of EpiPen - need to have device available at all times Patient instructed to have Benadryl, in addition to EpiPen, available at all times - reviewed indications for use of Benadryl - dosing - precautions and follow up.

## 2023-08-14 ENCOUNTER — LABORATORY RESULT (OUTPATIENT)
Age: 63
End: 2023-08-14

## 2023-08-16 ENCOUNTER — OUTPATIENT (OUTPATIENT)
Dept: OUTPATIENT SERVICES | Facility: HOSPITAL | Age: 63
LOS: 1 days | End: 2023-08-16
Payer: COMMERCIAL

## 2023-08-16 ENCOUNTER — APPOINTMENT (OUTPATIENT)
Dept: MAMMOGRAPHY | Facility: HOSPITAL | Age: 63
End: 2023-08-16
Payer: COMMERCIAL

## 2023-08-16 ENCOUNTER — APPOINTMENT (OUTPATIENT)
Dept: MAMMOGRAPHY | Facility: HOSPITAL | Age: 63
End: 2023-08-16

## 2023-08-16 ENCOUNTER — APPOINTMENT (OUTPATIENT)
Dept: RADIOLOGY | Facility: HOSPITAL | Age: 63
End: 2023-08-16
Payer: COMMERCIAL

## 2023-08-16 DIAGNOSIS — Z00.8 ENCOUNTER FOR OTHER GENERAL EXAMINATION: ICD-10-CM

## 2023-08-16 PROCEDURE — 77067 SCR MAMMO BI INCL CAD: CPT | Mod: 26

## 2023-08-16 PROCEDURE — 77067 SCR MAMMO BI INCL CAD: CPT

## 2023-08-16 PROCEDURE — 77063 BREAST TOMOSYNTHESIS BI: CPT | Mod: 26

## 2023-08-16 PROCEDURE — 77080 DXA BONE DENSITY AXIAL: CPT | Mod: 26

## 2023-08-16 PROCEDURE — 77063 BREAST TOMOSYNTHESIS BI: CPT

## 2023-08-16 PROCEDURE — 77080 DXA BONE DENSITY AXIAL: CPT

## 2023-08-22 ENCOUNTER — APPOINTMENT (OUTPATIENT)
Dept: ALLERGY | Facility: CLINIC | Age: 63
End: 2023-08-22
Payer: COMMERCIAL

## 2023-08-22 VITALS
HEIGHT: 63 IN | WEIGHT: 108 LBS | SYSTOLIC BLOOD PRESSURE: 118 MMHG | HEART RATE: 69 BPM | OXYGEN SATURATION: 98 % | BODY MASS INDEX: 19.14 KG/M2 | DIASTOLIC BLOOD PRESSURE: 78 MMHG

## 2023-08-22 PROCEDURE — 99214 OFFICE O/P EST MOD 30 MIN: CPT

## 2023-08-22 RX ORDER — EPINEPHRINE 0.3 MG/.3ML
0.3 INJECTION INTRAMUSCULAR
Qty: 2 | Refills: 0 | Status: ACTIVE | COMMUNITY
Start: 2023-08-01 | End: 1900-01-01

## 2023-08-22 NOTE — ASSESSMENT
[FreeTextEntry1] : Bee sting anaphylaxis:  Patient would like to start venom IT for mixed vespid and wasp  1. I have reviewed the treatment option of allergy immunotherapy. 2. I have reviewed allergen avoidance measures in order to reduce exposure to pertinent allergens. 3. IT QA booklet given to patient detailing allergy immunotherapy 4. I have recommended medical therapy for patient's allergy symptoms as outlined below.   EpiPen renewed.   Patient instructed on the proper use of an EpiPen - precautions - follow up after use of EpiPen - need to have device available at all times Patient instructed to have Benadryl, in addition to EpiPen, available at all times - reviewed indications for use of Benadryl - dosing - precautions and follow up.

## 2023-08-22 NOTE — PHYSICAL EXAM
[Alert] : alert [Well Nourished] : well nourished [Healthy Appearance] : healthy appearance [No Acute Distress] : no acute distress [Well Developed] : well developed [Normal Voice/Communication] : normal voice communication [No Neck Mass] : no neck mass was observed [No LAD] : no lymphadenopathy [Normal Rate and Effort] : normal respiratory rhythm and effort [No Crackles] : no crackles [No Retractions] : no retractions [Bilateral Audible Breath Sounds] : bilateral audible breath sounds [Normal Rate] : heart rate was normal  [Normal S1, S2] : normal S1 and S2 [No murmur] : no murmur [Regular Rhythm] : with a regular rhythm [Normal Cervical Lymph Nodes] : cervical [Skin Intact] : skin intact  [No Rash] : no rash [No Cyanosis] : no cyanosis [Normal Mood] : mood was normal [Normal Affect] : affect was normal [Judgment and Insight Age Appropriate] : judgement and insight is age appropriate [Alert, Awake, Oriented as Age-Appropriate] : alert, awake, oriented as age appropriate [Wheezing] : no wheezing was heard

## 2023-08-22 NOTE — HISTORY OF PRESENT ILLNESS
[de-identified] : Patient here to review results of blood work for bee sting allergy.   Two stings last Wednesday - palm and thigh - she administered her EpiPen - she was seen at [facility] - she was able to drive herself - administered a steroid shot and Pepcid - and no further symptoms.  Patient is an active hiker - gardens as well.

## 2023-08-23 ENCOUNTER — APPOINTMENT (OUTPATIENT)
Dept: ENDOCRINOLOGY | Facility: CLINIC | Age: 63
End: 2023-08-23
Payer: COMMERCIAL

## 2023-08-23 VITALS
HEART RATE: 56 BPM | OXYGEN SATURATION: 100 % | HEIGHT: 63 IN | BODY MASS INDEX: 18.78 KG/M2 | WEIGHT: 106 LBS | DIASTOLIC BLOOD PRESSURE: 74 MMHG | SYSTOLIC BLOOD PRESSURE: 116 MMHG | RESPIRATION RATE: 16 BRPM

## 2023-08-23 DIAGNOSIS — M85.852 OTHER SPECIFIED DISORDERS OF BONE DENSITY AND STRUCTURE, RIGHT THIGH: ICD-10-CM

## 2023-08-23 DIAGNOSIS — M85.851 OTHER SPECIFIED DISORDERS OF BONE DENSITY AND STRUCTURE, RIGHT THIGH: ICD-10-CM

## 2023-08-23 LAB
COMMON WASP (YELLOW JACKET) CLASS: 3
COMMON WASP (YELLOW JACKET) CONC: 12.1 KUA/L
DEPRECATED WHITEFACED HORNET IGE RAST QL: 3
HONEY BEE (I1) CLASS: 0
HONEY BEE (I1) CONC: <0.1 KUA/L
PAPER WASP (I4) CLASS: 3
PAPER WASP (I4) CONC: 15.5 KUA/L
TRYPTASE: 2.8 UG/L
WHITEFACED HORNET IGE QN: 4.12 KUA/L
YELLOW HORNET (I5) CLASS: 2
YELLOW HORNET (I5) CONC: 1.38 KUA/L

## 2023-08-23 PROCEDURE — 99204 OFFICE O/P NEW MOD 45 MIN: CPT

## 2023-08-23 NOTE — DATA REVIEWED
[FreeTextEntry1] : EXAM:  XR BONE DENSITY AXIAL   ORDERED BY: LANCE PRATT  PROCEDURE DATE:  08/16/2023  INTERPRETATION:  CLINICAL INDICATION: 62-year-old postmenopausal female for evaluation of bone mineralization. Screening for osteoporosis.  Thank you for referring your patient for bone densitometry on the HOLOGIC machine.  The results are expressed as a T-score, or the standard deviation from the mean young normal value, which is the basis for the WHO diagnostic criteria for bone density. The reference standard from which the T-score is calculated is the female, white, age 20-29 years, NHANES III database.  COMPARISON: Bone Density dated 6/18/2021. RESULTS:  Spine: -0.2, normal; Not significantly changed as compared with the prior study. Femoral neck: -2.3 left, osteopenia. Total hip: -1.5 left, osteopenia; Not significantly changed as compared with the prior study.  IMPRESSION: Osteopenia.  FRACTURE RISK: Using the FRAX 10 year fracture risk calculator the patient's ten-year risk of any fracture is 9.4% and the patient's risk of  hip fracture  is  1.6%. Additional risk factors used in the FRAX calculation: None.  TREATMENT RECOMMENDATIONS:  Based on NOF treatment guidelines medical therapy is not recommended at this time.    PROCEDURE DATE:  06/18/2021      INTERPRETATION:  BONE DENSITY EXAMINATION dated 6/18/2021.  CLINICAL STATEMENT:  Follow up examination. 60-year-old postmenopausal female for evaluation of bone mineralization. Evaluate for osteoporosis. No additional information is provided.    Comparison is made to prior study dated 3/13/2018.       LUMBAR SPINE:  AP position from L1 through L4.         BMD g/cm2 = 1.097         T-score = 0.5         Z-score = 1.9                                    LEFT HIP:     BMD g/cm2 = 0.787         T-score = -1.3         Z-score = -0.3                                      LEFT FOREARM:    BMD g/cm2 = 0.658                                 T-score = -0.4         Z-score = 0.9    When compared to a prior exam,  the lumbar spine BMD change is 3.5 % and the left hip change is -3.3 %  and the left forearm change is -4.2 %.    Impression: Findings are normal in the lumbar spine and osteopenia in the left hip and normal  in the left forearm.

## 2023-08-23 NOTE — PHYSICAL EXAM
[Alert] : alert [Well Nourished] : well nourished [No Acute Distress] : no acute distress [EOMI] : extra ocular movement intact [Normal Hearing] : hearing was normal [No Respiratory Distress] : no respiratory distress [Normal Rate] : heart rate was normal [No Stigmata of Cushings Syndrome] : no stigmata of Cushings Syndrome [Normal Gait] : normal gait [Oriented x3] : oriented to person, place, and time [Normal Affect] : the affect was normal [Normal Mood] : the mood was normal

## 2023-08-23 NOTE — ASSESSMENT
[FreeTextEntry1] : 63 year old female with PMH of bee venom allergy (life threatening), mild aortic insufficiency, migraines/headaches is presenting for Osteopenia.   1. Osteopenia with low FRAX score  -We discussed briefly the different treatment options for osteoporosis but given that patient does not meet the criteria -Repeat Bone density scan Aug 2025.  -Metabolic evaluation for secondary causes of osteoporosis -Calcium 1000 mg daily from diet and supplements (to be taken in divided doses as no more than 500mg can be absorbed at one time); advised increased dietary and/or supplemental calcium -Can continue vitamin D supplementation 2000 intl units daily -Diet, weight bearing exercises and fall prevention discussed  Patient verbalized understanding of the above. All questions were answered to patient's satisfaction. Dispo: Patient will follow up in aug 2025

## 2023-08-23 NOTE — HISTORY OF PRESENT ILLNESS
[FreeTextEntry1] : Ms. Portillo is presenting for Osteopenia.   63 year old female with PMH of bee venom allergy (life threatening), mild aortic insufficiency, migraines/headaches.   #Osteopenia  Fracture history: No  Family history: No parental history of hip fracture Prior Treatment: No Prior HRT: No, vaginal estrogen.  Allergies: NKDA  Bone History Menopause: 2011, age 51  DEXA 2023: T-scores of -0.2 at the lumbar spine, -2.3 at the femoral neck, __ at the distal radius. FRAX Major 9.4%, hip fracture is  1.6%.  DEXA 2021: T-scores of 0.5 at the lumbar spine, -1.3 at the femoral neck, -0.4 at the distal radius DEXA 2018: T-scores of 0.1 at the lumbar spine, -1.1 at the femoral neck, 0.1 at the distal radius  Falls: No Height loss: does not believe so  Kidney stones: No Dental health: Regular appointments, no history of implants, no upcoming procedures planned. H/o root canals.  Exercise: Walks. Weights 2-3x/week with squats.  Dairy intake: Yogurt, cheese, half and half, cottage cheese Calcium supplements: No  Multivitamin: Yes  Vitamin D supplements: 2,000IU Daily   Osteoporosis risk factors include: Postmenopausal status,  race, prior fracture, falls, height loss, small thin bones, tobacco use, malignancy, radiation treatment, excessive alcohol, anorexia, family history, vitamin D deficiency, long term corticosteroid use (>3 months), seizure medications (ie phenytoin), prolonged amenorrhea, eating disorders, malabsorption, hyperparathyroidism, hyperthyroidism. NEGATIVE EXCEPT:

## 2023-08-31 ENCOUNTER — NON-APPOINTMENT (OUTPATIENT)
Age: 63
End: 2023-08-31

## 2023-09-11 ENCOUNTER — NON-APPOINTMENT (OUTPATIENT)
Age: 63
End: 2023-09-11

## 2023-09-13 ENCOUNTER — LABORATORY RESULT (OUTPATIENT)
Age: 63
End: 2023-09-13

## 2023-09-19 LAB
ALBUMIN MFR SERPL ELPH: 63 %
ALBUMIN SERPL-MCNC: 4.3 G/DL
ALBUMIN/GLOB SERPL: 1.7 RATIO
ALBUPE: 10.5 %
ALP BONE SERPL-MCNC: 9.2 UG/L
ALPHA1 GLOB MFR SERPL ELPH: 3.3 %
ALPHA1 GLOB SERPL ELPH-MCNC: 0.2 G/DL
ALPHA1UPE: 20.4 %
ALPHA2 GLOB MFR SERPL ELPH: 7.4 %
ALPHA2 GLOB SERPL ELPH-MCNC: 0.5 G/DL
ALPHA2UPE: 17.8 %
B-GLOBULIN MFR SERPL ELPH: 10.7 %
B-GLOBULIN SERPL ELPH-MCNC: 0.7 G/DL
BENCE JONES EXCRETION: 0 MG/24HR
BETAUPE: 8.6 %
CALCIUM SERPL-MCNC: 9.6 MG/DL
CELIAC DISEASE INTERPRETATION: NORMAL
CELIAC GENE PAIRS PRESENT: NO
COLLAGEN CTX SERPL-MCNC: 415 PG/ML
CREAT 24H UR-MCNC: 0.7 G/24 H
CREATININE UR (MAYO): 28 MG/DL
DQ ALPHA 1: NORMAL
DQ BETA 1: NORMAL
GAMMA GLOB FLD ELPH-MCNC: 1.1 G/DL
GAMMA GLOB MFR SERPL ELPH: 15.6 %
GAMMAUPE: 42.7 %
IGA 24H UR QL IFE: NORMAL
IMMUNOGLOBULIN A (IGA): 225 MG/DL
INTERPRETATION SERPL IEP-IMP: NORMAL
KAPPA LC 24H UR QL: 0 MG/DL
M PROTEIN 24H MFR UR ELPH: 0 %
MAGNESIUM SERPL-MCNC: 2 MG/DL
PARATHYROID HORMONE INTACT: 22 PG/ML
PHOSPHATE SERPL-MCNC: 3.9 MG/DL
PROT ?TM UR-MCNC: 24 HR
PROT PATTERN 24H UR ELPH-IMP: NORMAL
PROT SERPL-MCNC: 6.8 G/DL
PROT SERPL-MCNC: 6.8 G/DL
PROT UR-MCNC: 10 MG/DL
PROT UR-MCNC: 10 MG/DL
SPECIMEN VOL 24H UR: 2550 ML
U PROTEIN QNT CALCULATION: 255 MG/24 H

## 2023-09-20 ENCOUNTER — APPOINTMENT (OUTPATIENT)
Dept: ALLERGY | Facility: CLINIC | Age: 63
End: 2023-09-20
Payer: COMMERCIAL

## 2023-09-20 VITALS
SYSTOLIC BLOOD PRESSURE: 122 MMHG | OXYGEN SATURATION: 99 % | HEART RATE: 51 BPM | DIASTOLIC BLOOD PRESSURE: 80 MMHG | TEMPERATURE: 98.6 F

## 2023-09-20 PROCEDURE — 95117 IMMUNOTHERAPY INJECTIONS: CPT

## 2023-09-20 PROCEDURE — 95148 ANTIGEN THERAPY SERVICES: CPT

## 2023-09-26 ENCOUNTER — APPOINTMENT (OUTPATIENT)
Dept: FAMILY MEDICINE | Facility: CLINIC | Age: 63
End: 2023-09-26

## 2023-09-26 ENCOUNTER — APPOINTMENT (OUTPATIENT)
Dept: ALLERGY | Facility: CLINIC | Age: 63
End: 2023-09-26
Payer: COMMERCIAL

## 2023-09-26 PROCEDURE — 95148 ANTIGEN THERAPY SERVICES: CPT

## 2023-09-26 PROCEDURE — 95117 IMMUNOTHERAPY INJECTIONS: CPT

## 2023-10-04 ENCOUNTER — APPOINTMENT (OUTPATIENT)
Dept: ALLERGY | Facility: CLINIC | Age: 63
End: 2023-10-04
Payer: COMMERCIAL

## 2023-10-04 PROCEDURE — 95148 ANTIGEN THERAPY SERVICES: CPT

## 2023-10-04 PROCEDURE — 95117 IMMUNOTHERAPY INJECTIONS: CPT

## 2023-10-11 ENCOUNTER — APPOINTMENT (OUTPATIENT)
Dept: ALLERGY | Facility: CLINIC | Age: 63
End: 2023-10-11
Payer: COMMERCIAL

## 2023-10-11 PROCEDURE — 95117 IMMUNOTHERAPY INJECTIONS: CPT

## 2023-10-11 PROCEDURE — 95148 ANTIGEN THERAPY SERVICES: CPT

## 2023-10-18 ENCOUNTER — APPOINTMENT (OUTPATIENT)
Dept: ALLERGY | Facility: CLINIC | Age: 63
End: 2023-10-18
Payer: COMMERCIAL

## 2023-10-18 PROCEDURE — 95117 IMMUNOTHERAPY INJECTIONS: CPT

## 2023-10-18 PROCEDURE — 95148 ANTIGEN THERAPY SERVICES: CPT

## 2023-11-01 ENCOUNTER — APPOINTMENT (OUTPATIENT)
Dept: ALLERGY | Facility: CLINIC | Age: 63
End: 2023-11-01
Payer: COMMERCIAL

## 2023-11-01 PROCEDURE — 95148 ANTIGEN THERAPY SERVICES: CPT

## 2023-11-01 PROCEDURE — 95117 IMMUNOTHERAPY INJECTIONS: CPT

## 2023-11-08 ENCOUNTER — APPOINTMENT (OUTPATIENT)
Dept: ALLERGY | Facility: CLINIC | Age: 63
End: 2023-11-08
Payer: COMMERCIAL

## 2023-11-08 PROCEDURE — 95148 ANTIGEN THERAPY SERVICES: CPT

## 2023-11-08 PROCEDURE — 95117 IMMUNOTHERAPY INJECTIONS: CPT

## 2023-11-10 ENCOUNTER — APPOINTMENT (OUTPATIENT)
Dept: NEPHROLOGY | Facility: CLINIC | Age: 63
End: 2023-11-10
Payer: COMMERCIAL

## 2023-11-10 VITALS
HEIGHT: 63 IN | HEART RATE: 60 BPM | OXYGEN SATURATION: 98 % | DIASTOLIC BLOOD PRESSURE: 70 MMHG | SYSTOLIC BLOOD PRESSURE: 113 MMHG | WEIGHT: 104.72 LBS | TEMPERATURE: 97.6 F | BODY MASS INDEX: 18.55 KG/M2

## 2023-11-10 DIAGNOSIS — M85.80 OTHER SPECIFIED DISORDERS OF BONE DENSITY AND STRUCTURE, UNSPECIFIED SITE: ICD-10-CM

## 2023-11-10 DIAGNOSIS — R80.9 PROTEINURIA, UNSPECIFIED: ICD-10-CM

## 2023-11-10 DIAGNOSIS — Z78.9 OTHER SPECIFIED HEALTH STATUS: ICD-10-CM

## 2023-11-10 PROCEDURE — 99203 OFFICE O/P NEW LOW 30 MIN: CPT

## 2023-11-10 RX ORDER — BUTALBITAL, ACETAMINOPHEN AND CAFFEINE 325; 50; 40 MG/1; MG/1; MG/1
50-325-40 TABLET ORAL
Qty: 60 | Refills: 3 | Status: DISCONTINUED | COMMUNITY
Start: 2019-04-19 | End: 2023-11-10

## 2023-11-10 RX ORDER — EPINEPHRINE 0.3 MG/.3ML
0.3 INJECTION INTRAMUSCULAR
Qty: 2 | Refills: 0 | Status: DISCONTINUED | COMMUNITY
Start: 2023-07-03 | End: 2023-11-10

## 2023-11-13 LAB
APPEARANCE: CLEAR
BILIRUBIN URINE: NEGATIVE
BLOOD URINE: NEGATIVE
COLOR: YELLOW
CREAT SPEC-SCNC: 88 MG/DL
CREAT/PROT UR: 0.1 RATIO
GLUCOSE QUALITATIVE U: NEGATIVE MG/DL
KETONES URINE: ABNORMAL MG/DL
LEUKOCYTE ESTERASE URINE: NEGATIVE
NITRITE URINE: NEGATIVE
PH URINE: 5.5
PROT UR-MCNC: 5 MG/DL
PROTEIN URINE: NEGATIVE MG/DL
SPECIFIC GRAVITY URINE: 1.02
UROBILINOGEN URINE: 0.2 MG/DL

## 2023-11-14 ENCOUNTER — APPOINTMENT (OUTPATIENT)
Dept: ALLERGY | Facility: CLINIC | Age: 63
End: 2023-11-14
Payer: COMMERCIAL

## 2023-11-14 PROCEDURE — 95117 IMMUNOTHERAPY INJECTIONS: CPT

## 2023-11-14 PROCEDURE — 95148 ANTIGEN THERAPY SERVICES: CPT

## 2023-11-22 ENCOUNTER — APPOINTMENT (OUTPATIENT)
Dept: ALLERGY | Facility: CLINIC | Age: 63
End: 2023-11-22
Payer: COMMERCIAL

## 2023-11-22 PROCEDURE — 95148 ANTIGEN THERAPY SERVICES: CPT

## 2023-11-22 PROCEDURE — 95117 IMMUNOTHERAPY INJECTIONS: CPT

## 2023-11-27 ENCOUNTER — APPOINTMENT (OUTPATIENT)
Dept: ALLERGY | Facility: CLINIC | Age: 63
End: 2023-11-27
Payer: COMMERCIAL

## 2023-11-27 PROCEDURE — 95117 IMMUNOTHERAPY INJECTIONS: CPT

## 2023-11-27 PROCEDURE — 95148 ANTIGEN THERAPY SERVICES: CPT

## 2023-12-04 ENCOUNTER — APPOINTMENT (OUTPATIENT)
Dept: ALLERGY | Facility: CLINIC | Age: 63
End: 2023-12-04
Payer: COMMERCIAL

## 2023-12-04 PROCEDURE — 95148 ANTIGEN THERAPY SERVICES: CPT

## 2023-12-04 PROCEDURE — 95117 IMMUNOTHERAPY INJECTIONS: CPT

## 2023-12-13 ENCOUNTER — APPOINTMENT (OUTPATIENT)
Dept: ALLERGY | Facility: CLINIC | Age: 63
End: 2023-12-13

## 2023-12-20 ENCOUNTER — APPOINTMENT (OUTPATIENT)
Dept: ALLERGY | Facility: CLINIC | Age: 63
End: 2023-12-20
Payer: COMMERCIAL

## 2023-12-20 PROCEDURE — 95117 IMMUNOTHERAPY INJECTIONS: CPT

## 2023-12-20 PROCEDURE — 95148 ANTIGEN THERAPY SERVICES: CPT

## 2024-01-02 ENCOUNTER — APPOINTMENT (OUTPATIENT)
Dept: ALLERGY | Facility: CLINIC | Age: 64
End: 2024-01-02
Payer: COMMERCIAL

## 2024-01-02 PROCEDURE — 95148 ANTIGEN THERAPY SERVICES: CPT

## 2024-01-02 PROCEDURE — 95117 IMMUNOTHERAPY INJECTIONS: CPT

## 2024-01-10 ENCOUNTER — APPOINTMENT (OUTPATIENT)
Dept: ALLERGY | Facility: CLINIC | Age: 64
End: 2024-01-10
Payer: COMMERCIAL

## 2024-01-10 PROCEDURE — 95148 ANTIGEN THERAPY SERVICES: CPT

## 2024-01-10 PROCEDURE — 95117 IMMUNOTHERAPY INJECTIONS: CPT

## 2024-01-16 ENCOUNTER — APPOINTMENT (OUTPATIENT)
Dept: ALLERGY | Facility: CLINIC | Age: 64
End: 2024-01-16

## 2024-01-23 ENCOUNTER — APPOINTMENT (OUTPATIENT)
Dept: ALLERGY | Facility: CLINIC | Age: 64
End: 2024-01-23

## 2024-01-24 ENCOUNTER — APPOINTMENT (OUTPATIENT)
Dept: ALLERGY | Facility: CLINIC | Age: 64
End: 2024-01-24
Payer: COMMERCIAL

## 2024-01-24 PROCEDURE — 95117 IMMUNOTHERAPY INJECTIONS: CPT

## 2024-01-24 PROCEDURE — 95148 ANTIGEN THERAPY SERVICES: CPT

## 2024-01-30 ENCOUNTER — APPOINTMENT (OUTPATIENT)
Dept: FAMILY MEDICINE | Facility: CLINIC | Age: 64
End: 2024-01-30
Payer: COMMERCIAL

## 2024-01-30 VITALS
BODY MASS INDEX: 18.96 KG/M2 | HEIGHT: 63 IN | WEIGHT: 107 LBS | TEMPERATURE: 97.3 F | SYSTOLIC BLOOD PRESSURE: 118 MMHG | RESPIRATION RATE: 18 BRPM | OXYGEN SATURATION: 98 % | DIASTOLIC BLOOD PRESSURE: 62 MMHG | HEART RATE: 58 BPM

## 2024-01-30 DIAGNOSIS — U07.1 COVID-19: ICD-10-CM

## 2024-01-30 DIAGNOSIS — K21.9 GASTRO-ESOPHAGEAL REFLUX DISEASE W/OUT ESOPHAGITIS: ICD-10-CM

## 2024-01-30 DIAGNOSIS — R43.8 OTHER DISTURBANCES OF SMELL AND TASTE: ICD-10-CM

## 2024-01-30 PROCEDURE — 99213 OFFICE O/P EST LOW 20 MIN: CPT

## 2024-01-30 RX ORDER — PANTOPRAZOLE 20 MG/1
20 TABLET, DELAYED RELEASE ORAL TWICE DAILY
Qty: 120 | Refills: 0 | Status: ACTIVE | COMMUNITY
Start: 2024-01-30 | End: 1900-01-01

## 2024-01-30 NOTE — ASSESSMENT
[FreeTextEntry1] : # metallic taste, acid reflux Will treat as acid reflux for now trial of PPI BID x 2 months If no improvement after 6-8 weeks would consider referring to ENT or GI for further eval  f/u PRN

## 2024-01-30 NOTE — HISTORY OF PRESENT ILLNESS
[FreeTextEntry8] : Pt comes in for eval of metallic taste in mouth. Has been going on for over 1 month. She saw her dentist, it seems very dry, but teeth/gums ok. They recommended increasing water intake. She started taking Tums once in night and once in daytime. She tried to cut out nuts, didn't make a difference.

## 2024-01-30 NOTE — PHYSICAL EXAM
[Normal] : no acute distress, well nourished, well developed and well-appearing [Normal Sclera/Conjunctiva] : normal sclera/conjunctiva [Normal Outer Ear/Nose] : the outer ears and nose were normal in appearance [Normal Oropharynx] : the oropharynx was normal [No Lymphadenopathy] : no lymphadenopathy [Supple] : supple [No Respiratory Distress] : no respiratory distress  [Soft] : abdomen soft [Non Tender] : non-tender [Non-distended] : non-distended [No Masses] : no abdominal mass palpated [Normal Supraclavicular Nodes] : no supraclavicular lymphadenopathy [Normal Anterior Cervical Nodes] : no anterior cervical lymphadenopathy [Normal Gait] : normal gait [Normal Affect] : the affect was normal [Alert and Oriented x3] : oriented to person, place, and time [Normal Insight/Judgement] : insight and judgment were intact

## 2024-01-30 NOTE — REVIEW OF SYSTEMS
[Abdominal Pain] : no abdominal pain [Nausea] : no nausea [Heartburn] : no heartburn [Negative] : Cardiovascular [FreeTextEntry7] : metallic taste in mouth

## 2024-01-31 ENCOUNTER — APPOINTMENT (OUTPATIENT)
Dept: ALLERGY | Facility: CLINIC | Age: 64
End: 2024-01-31
Payer: COMMERCIAL

## 2024-01-31 PROCEDURE — 95148 ANTIGEN THERAPY SERVICES: CPT

## 2024-01-31 PROCEDURE — 95117 IMMUNOTHERAPY INJECTIONS: CPT

## 2024-02-07 ENCOUNTER — APPOINTMENT (OUTPATIENT)
Dept: ALLERGY | Facility: CLINIC | Age: 64
End: 2024-02-07
Payer: COMMERCIAL

## 2024-02-07 PROCEDURE — 95117 IMMUNOTHERAPY INJECTIONS: CPT

## 2024-02-07 PROCEDURE — 95148 ANTIGEN THERAPY SERVICES: CPT

## 2024-02-14 ENCOUNTER — APPOINTMENT (OUTPATIENT)
Dept: ALLERGY | Facility: CLINIC | Age: 64
End: 2024-02-14
Payer: COMMERCIAL

## 2024-02-14 PROCEDURE — 95117 IMMUNOTHERAPY INJECTIONS: CPT

## 2024-02-14 PROCEDURE — 95148 ANTIGEN THERAPY SERVICES: CPT

## 2024-02-28 ENCOUNTER — APPOINTMENT (OUTPATIENT)
Dept: ALLERGY | Facility: CLINIC | Age: 64
End: 2024-02-28
Payer: COMMERCIAL

## 2024-02-28 PROCEDURE — 95148 ANTIGEN THERAPY SERVICES: CPT

## 2024-02-28 PROCEDURE — 95117 IMMUNOTHERAPY INJECTIONS: CPT

## 2024-03-06 ENCOUNTER — APPOINTMENT (OUTPATIENT)
Dept: ALLERGY | Facility: CLINIC | Age: 64
End: 2024-03-06
Payer: COMMERCIAL

## 2024-03-06 PROCEDURE — 95148 ANTIGEN THERAPY SERVICES: CPT

## 2024-03-06 PROCEDURE — 95117 IMMUNOTHERAPY INJECTIONS: CPT

## 2024-03-13 ENCOUNTER — APPOINTMENT (OUTPATIENT)
Dept: ALLERGY | Facility: CLINIC | Age: 64
End: 2024-03-13
Payer: COMMERCIAL

## 2024-03-13 PROCEDURE — 95117 IMMUNOTHERAPY INJECTIONS: CPT

## 2024-03-13 PROCEDURE — 95148 ANTIGEN THERAPY SERVICES: CPT

## 2024-03-20 ENCOUNTER — APPOINTMENT (OUTPATIENT)
Dept: ALLERGY | Facility: CLINIC | Age: 64
End: 2024-03-20
Payer: COMMERCIAL

## 2024-03-20 PROCEDURE — 95148 ANTIGEN THERAPY SERVICES: CPT

## 2024-03-20 PROCEDURE — 95117 IMMUNOTHERAPY INJECTIONS: CPT

## 2024-04-03 ENCOUNTER — APPOINTMENT (OUTPATIENT)
Dept: ALLERGY | Facility: CLINIC | Age: 64
End: 2024-04-03
Payer: COMMERCIAL

## 2024-04-03 PROCEDURE — 95148 ANTIGEN THERAPY SERVICES: CPT | Mod: 59

## 2024-04-03 PROCEDURE — 95117 IMMUNOTHERAPY INJECTIONS: CPT

## 2024-04-09 ENCOUNTER — APPOINTMENT (OUTPATIENT)
Dept: ALLERGY | Facility: CLINIC | Age: 64
End: 2024-04-09
Payer: COMMERCIAL

## 2024-04-09 PROCEDURE — 95148 ANTIGEN THERAPY SERVICES: CPT

## 2024-04-09 PROCEDURE — 95117 IMMUNOTHERAPY INJECTIONS: CPT

## 2024-04-17 ENCOUNTER — APPOINTMENT (OUTPATIENT)
Dept: ALLERGY | Facility: CLINIC | Age: 64
End: 2024-04-17
Payer: COMMERCIAL

## 2024-04-17 PROCEDURE — 95117 IMMUNOTHERAPY INJECTIONS: CPT

## 2024-04-17 PROCEDURE — 95148 ANTIGEN THERAPY SERVICES: CPT

## 2024-05-01 ENCOUNTER — APPOINTMENT (OUTPATIENT)
Dept: ALLERGY | Facility: CLINIC | Age: 64
End: 2024-05-01
Payer: COMMERCIAL

## 2024-05-01 PROCEDURE — 95117 IMMUNOTHERAPY INJECTIONS: CPT

## 2024-05-01 PROCEDURE — 95148 ANTIGEN THERAPY SERVICES: CPT

## 2024-05-22 ENCOUNTER — APPOINTMENT (OUTPATIENT)
Dept: ALLERGY | Facility: CLINIC | Age: 64
End: 2024-05-22
Payer: COMMERCIAL

## 2024-05-22 PROCEDURE — 95148 ANTIGEN THERAPY SERVICES: CPT

## 2024-05-22 PROCEDURE — 95117 IMMUNOTHERAPY INJECTIONS: CPT

## 2024-06-12 ENCOUNTER — APPOINTMENT (OUTPATIENT)
Dept: ALLERGY | Facility: CLINIC | Age: 64
End: 2024-06-12
Payer: COMMERCIAL

## 2024-06-12 PROCEDURE — 95148 ANTIGEN THERAPY SERVICES: CPT

## 2024-06-12 PROCEDURE — 95117 IMMUNOTHERAPY INJECTIONS: CPT

## 2024-07-08 ENCOUNTER — APPOINTMENT (OUTPATIENT)
Dept: ALLERGY | Facility: CLINIC | Age: 64
End: 2024-07-08
Payer: COMMERCIAL

## 2024-07-08 PROCEDURE — 95117 IMMUNOTHERAPY INJECTIONS: CPT

## 2024-07-08 PROCEDURE — 95148 ANTIGEN THERAPY SERVICES: CPT

## 2024-07-31 ENCOUNTER — APPOINTMENT (OUTPATIENT)
Dept: ALLERGY | Facility: CLINIC | Age: 64
End: 2024-07-31
Payer: COMMERCIAL

## 2024-07-31 PROCEDURE — 95148 ANTIGEN THERAPY SERVICES: CPT

## 2024-07-31 PROCEDURE — 95117 IMMUNOTHERAPY INJECTIONS: CPT

## 2024-08-19 ENCOUNTER — APPOINTMENT (OUTPATIENT)
Dept: MAMMOGRAPHY | Facility: HOSPITAL | Age: 64
End: 2024-08-19

## 2024-08-29 ENCOUNTER — APPOINTMENT (OUTPATIENT)
Dept: ALLERGY | Facility: CLINIC | Age: 64
End: 2024-08-29
Payer: COMMERCIAL

## 2024-08-29 PROCEDURE — 95148 ANTIGEN THERAPY SERVICES: CPT

## 2024-08-29 PROCEDURE — 95117 IMMUNOTHERAPY INJECTIONS: CPT

## 2024-10-02 ENCOUNTER — APPOINTMENT (OUTPATIENT)
Dept: ALLERGY | Facility: CLINIC | Age: 64
End: 2024-10-02
Payer: COMMERCIAL

## 2024-10-02 PROCEDURE — 95148 ANTIGEN THERAPY SERVICES: CPT

## 2024-10-02 PROCEDURE — 95117 IMMUNOTHERAPY INJECTIONS: CPT

## 2024-10-30 ENCOUNTER — APPOINTMENT (OUTPATIENT)
Dept: ALLERGY | Facility: CLINIC | Age: 64
End: 2024-10-30
Payer: COMMERCIAL

## 2024-10-30 PROCEDURE — 95117 IMMUNOTHERAPY INJECTIONS: CPT

## 2024-10-30 PROCEDURE — 95148 ANTIGEN THERAPY SERVICES: CPT

## 2024-11-06 ENCOUNTER — APPOINTMENT (OUTPATIENT)
Dept: RADIOLOGY | Facility: HOSPITAL | Age: 64
End: 2024-11-06
Payer: COMMERCIAL

## 2024-11-06 ENCOUNTER — APPOINTMENT (OUTPATIENT)
Dept: ULTRASOUND IMAGING | Facility: HOSPITAL | Age: 64
End: 2024-11-06
Payer: COMMERCIAL

## 2024-11-06 ENCOUNTER — OUTPATIENT (OUTPATIENT)
Dept: OUTPATIENT SERVICES | Facility: HOSPITAL | Age: 64
LOS: 1 days | End: 2024-11-06
Payer: COMMERCIAL

## 2024-11-06 ENCOUNTER — APPOINTMENT (OUTPATIENT)
Dept: MAMMOGRAPHY | Facility: HOSPITAL | Age: 64
End: 2024-11-06
Payer: COMMERCIAL

## 2024-11-06 DIAGNOSIS — Z00.8 ENCOUNTER FOR OTHER GENERAL EXAMINATION: ICD-10-CM

## 2024-11-06 DIAGNOSIS — Z12.39 ENCOUNTER FOR OTHER SCREENING FOR MALIGNANT NEOPLASM OF BREAST: ICD-10-CM

## 2024-11-06 PROCEDURE — 77067 SCR MAMMO BI INCL CAD: CPT

## 2024-11-06 PROCEDURE — 76641 ULTRASOUND BREAST COMPLETE: CPT | Mod: 26,50

## 2024-11-06 PROCEDURE — 77063 BREAST TOMOSYNTHESIS BI: CPT

## 2024-11-06 PROCEDURE — 77080 DXA BONE DENSITY AXIAL: CPT | Mod: 26

## 2024-11-06 PROCEDURE — 77063 BREAST TOMOSYNTHESIS BI: CPT | Mod: 26

## 2024-11-06 PROCEDURE — 76641 ULTRASOUND BREAST COMPLETE: CPT

## 2024-11-06 PROCEDURE — 77080 DXA BONE DENSITY AXIAL: CPT

## 2024-11-06 PROCEDURE — 77067 SCR MAMMO BI INCL CAD: CPT | Mod: 26

## 2024-11-27 ENCOUNTER — APPOINTMENT (OUTPATIENT)
Dept: ALLERGY | Facility: CLINIC | Age: 64
End: 2024-11-27

## 2024-12-11 ENCOUNTER — APPOINTMENT (OUTPATIENT)
Dept: ALLERGY | Facility: CLINIC | Age: 64
End: 2024-12-11
Payer: COMMERCIAL

## 2024-12-11 PROCEDURE — 95117 IMMUNOTHERAPY INJECTIONS: CPT

## 2024-12-11 PROCEDURE — 95148 ANTIGEN THERAPY SERVICES: CPT

## 2025-01-08 ENCOUNTER — APPOINTMENT (OUTPATIENT)
Dept: ALLERGY | Facility: CLINIC | Age: 65
End: 2025-01-08
Payer: COMMERCIAL

## 2025-01-08 PROCEDURE — 95148 ANTIGEN THERAPY SERVICES: CPT

## 2025-01-08 PROCEDURE — 95117 IMMUNOTHERAPY INJECTIONS: CPT

## 2025-02-10 ENCOUNTER — APPOINTMENT (OUTPATIENT)
Dept: ALLERGY | Facility: CLINIC | Age: 65
End: 2025-02-10
Payer: COMMERCIAL

## 2025-02-10 PROCEDURE — 95117 IMMUNOTHERAPY INJECTIONS: CPT

## 2025-02-10 PROCEDURE — 95148 ANTIGEN THERAPY SERVICES: CPT

## 2025-02-26 ENCOUNTER — NON-APPOINTMENT (OUTPATIENT)
Age: 65
End: 2025-02-26

## 2025-02-26 ENCOUNTER — APPOINTMENT (OUTPATIENT)
Dept: ALLERGY | Facility: CLINIC | Age: 65
End: 2025-02-26
Payer: COMMERCIAL

## 2025-02-26 VITALS
TEMPERATURE: 98.2 F | HEART RATE: 82 BPM | DIASTOLIC BLOOD PRESSURE: 66 MMHG | SYSTOLIC BLOOD PRESSURE: 110 MMHG | OXYGEN SATURATION: 96 %

## 2025-02-26 PROCEDURE — 99213 OFFICE O/P EST LOW 20 MIN: CPT

## 2025-03-19 ENCOUNTER — APPOINTMENT (OUTPATIENT)
Dept: ALLERGY | Facility: CLINIC | Age: 65
End: 2025-03-19
Payer: COMMERCIAL

## 2025-03-19 PROCEDURE — 95117 IMMUNOTHERAPY INJECTIONS: CPT

## 2025-03-19 PROCEDURE — 95148 ANTIGEN THERAPY SERVICES: CPT

## 2025-04-09 ENCOUNTER — APPOINTMENT (OUTPATIENT)
Dept: ENDOCRINOLOGY | Facility: CLINIC | Age: 65
End: 2025-04-09

## 2025-04-09 VITALS
SYSTOLIC BLOOD PRESSURE: 139 MMHG | WEIGHT: 109 LBS | DIASTOLIC BLOOD PRESSURE: 89 MMHG | BODY MASS INDEX: 19.31 KG/M2 | HEART RATE: 80 BPM | HEIGHT: 63 IN | OXYGEN SATURATION: 99 %

## 2025-04-09 DIAGNOSIS — M85.852 OTHER SPECIFIED DISORDERS OF BONE DENSITY AND STRUCTURE, RIGHT THIGH: ICD-10-CM

## 2025-04-09 DIAGNOSIS — M85.851 OTHER SPECIFIED DISORDERS OF BONE DENSITY AND STRUCTURE, RIGHT THIGH: ICD-10-CM

## 2025-04-09 PROCEDURE — 99214 OFFICE O/P EST MOD 30 MIN: CPT

## 2025-04-10 LAB
25(OH)D3 SERPL-MCNC: 68.2 NG/ML
ALBUMIN SERPL ELPH-MCNC: 4.5 G/DL
ALP BLD-CCNC: 64 U/L
ALT SERPL-CCNC: 14 U/L
ANION GAP SERPL CALC-SCNC: 11 MMOL/L
AST SERPL-CCNC: 31 U/L
BILIRUB SERPL-MCNC: 0.3 MG/DL
BUN SERPL-MCNC: 18 MG/DL
CALCIUM SERPL-MCNC: 9.7 MG/DL
CALCIUM SERPL-MCNC: 9.7 MG/DL
CHLORIDE SERPL-SCNC: 104 MMOL/L
CO2 SERPL-SCNC: 26 MMOL/L
CREAT SERPL-MCNC: 0.82 MG/DL
EGFRCR SERPLBLD CKD-EPI 2021: 80 ML/MIN/1.73M2
GLUCOSE SERPL-MCNC: 88 MG/DL
MAGNESIUM SERPL-MCNC: 2.1 MG/DL
PARATHYROID HORMONE INTACT: 26 PG/ML
PHOSPHATE SERPL-MCNC: 4.3 MG/DL
POTASSIUM SERPL-SCNC: 4.5 MMOL/L
PROT SERPL-MCNC: 7.1 G/DL
SODIUM SERPL-SCNC: 140 MMOL/L
T4 FREE SERPL-MCNC: 1.1 NG/DL
TSH SERPL-ACNC: 1.95 UIU/ML

## 2025-04-11 RX ORDER — ALENDRONATE SODIUM 35 MG/1
35 TABLET ORAL
Qty: 1 | Refills: 0 | Status: ACTIVE | COMMUNITY
Start: 2025-04-10 | End: 1900-01-01

## 2025-04-23 ENCOUNTER — RX RENEWAL (OUTPATIENT)
Age: 65
End: 2025-04-23

## 2025-04-23 ENCOUNTER — APPOINTMENT (OUTPATIENT)
Dept: ALLERGY | Facility: CLINIC | Age: 65
End: 2025-04-23
Payer: COMMERCIAL

## 2025-04-23 PROCEDURE — 95148 ANTIGEN THERAPY SERVICES: CPT

## 2025-04-23 PROCEDURE — 95117 IMMUNOTHERAPY INJECTIONS: CPT

## 2025-05-07 ENCOUNTER — OUTPATIENT (OUTPATIENT)
Dept: OUTPATIENT SERVICES | Facility: HOSPITAL | Age: 65
LOS: 1 days | End: 2025-05-07
Payer: COMMERCIAL

## 2025-05-07 ENCOUNTER — APPOINTMENT (OUTPATIENT)
Dept: ULTRASOUND IMAGING | Facility: HOSPITAL | Age: 65
End: 2025-05-07
Payer: COMMERCIAL

## 2025-05-07 DIAGNOSIS — R92.2 INCONCLUSIVE MAMMOGRAM: ICD-10-CM

## 2025-05-07 PROCEDURE — 76642 ULTRASOUND BREAST LIMITED: CPT

## 2025-05-07 PROCEDURE — 76642 ULTRASOUND BREAST LIMITED: CPT | Mod: 26,RT

## 2025-06-04 ENCOUNTER — APPOINTMENT (OUTPATIENT)
Dept: ALLERGY | Facility: CLINIC | Age: 65
End: 2025-06-04
Payer: COMMERCIAL

## 2025-06-04 PROCEDURE — 95117 IMMUNOTHERAPY INJECTIONS: CPT

## 2025-06-04 PROCEDURE — 95148 ANTIGEN THERAPY SERVICES: CPT

## 2025-07-16 ENCOUNTER — APPOINTMENT (OUTPATIENT)
Dept: ALLERGY | Facility: CLINIC | Age: 65
End: 2025-07-16
Payer: COMMERCIAL

## 2025-07-16 PROCEDURE — 95148 ANTIGEN THERAPY SERVICES: CPT

## 2025-07-16 PROCEDURE — 95117 IMMUNOTHERAPY INJECTIONS: CPT

## 2025-08-27 ENCOUNTER — APPOINTMENT (OUTPATIENT)
Dept: ALLERGY | Facility: CLINIC | Age: 65
End: 2025-08-27
Payer: COMMERCIAL

## 2025-08-27 PROCEDURE — 95148 ANTIGEN THERAPY SERVICES: CPT

## 2025-09-11 ENCOUNTER — APPOINTMENT (OUTPATIENT)
Dept: FAMILY MEDICINE | Facility: CLINIC | Age: 65
End: 2025-09-11
Payer: COMMERCIAL

## 2025-09-11 VITALS
BODY MASS INDEX: 18.78 KG/M2 | HEART RATE: 73 BPM | WEIGHT: 106 LBS | DIASTOLIC BLOOD PRESSURE: 70 MMHG | HEIGHT: 63 IN | RESPIRATION RATE: 16 BRPM | OXYGEN SATURATION: 99 % | SYSTOLIC BLOOD PRESSURE: 116 MMHG | TEMPERATURE: 97.7 F

## 2025-09-11 DIAGNOSIS — Z00.00 ENCOUNTER FOR GENERAL ADULT MEDICAL EXAMINATION W/OUT ABNORMAL FINDINGS: ICD-10-CM

## 2025-09-11 PROCEDURE — 99396 PREV VISIT EST AGE 40-64: CPT
